# Patient Record
Sex: MALE | Race: WHITE | HISPANIC OR LATINO | Employment: FULL TIME | URBAN - METROPOLITAN AREA
[De-identification: names, ages, dates, MRNs, and addresses within clinical notes are randomized per-mention and may not be internally consistent; named-entity substitution may affect disease eponyms.]

---

## 2023-12-21 ENCOUNTER — APPOINTMENT (OUTPATIENT)
Dept: RADIOLOGY | Facility: MEDICAL CENTER | Age: 48
DRG: 854 | End: 2023-12-21
Attending: EMERGENCY MEDICINE

## 2023-12-21 ENCOUNTER — HOSPITAL ENCOUNTER (INPATIENT)
Facility: MEDICAL CENTER | Age: 48
LOS: 8 days | DRG: 854 | End: 2023-12-29
Attending: EMERGENCY MEDICINE | Admitting: HOSPITALIST

## 2023-12-21 ENCOUNTER — ANESTHESIA (OUTPATIENT)
Dept: SURGERY | Facility: MEDICAL CENTER | Age: 48
DRG: 854 | End: 2023-12-21

## 2023-12-21 ENCOUNTER — ANESTHESIA EVENT (OUTPATIENT)
Dept: SURGERY | Facility: MEDICAL CENTER | Age: 48
DRG: 854 | End: 2023-12-21

## 2023-12-21 DIAGNOSIS — K21.9 GASTROESOPHAGEAL REFLUX DISEASE WITHOUT ESOPHAGITIS: ICD-10-CM

## 2023-12-21 DIAGNOSIS — Z89.511 STATUS POST BELOW-KNEE AMPUTATION OF RIGHT LOWER EXTREMITY (HCC): ICD-10-CM

## 2023-12-21 DIAGNOSIS — I10 PRIMARY HYPERTENSION: ICD-10-CM

## 2023-12-21 DIAGNOSIS — R78.81 BACTEREMIA: ICD-10-CM

## 2023-12-21 DIAGNOSIS — E11.52 GANGRENE ASSOCIATED WITH DIABETES MELLITUS (HCC): ICD-10-CM

## 2023-12-21 DIAGNOSIS — J06.9 UPPER RESPIRATORY TRACT INFECTION, UNSPECIFIED TYPE: ICD-10-CM

## 2023-12-21 PROBLEM — M86.071 ACUTE HEMATOGENOUS OSTEOMYELITIS OF RIGHT FOOT (HCC): Status: ACTIVE | Noted: 2023-12-21

## 2023-12-21 PROBLEM — T14.8XXA FRACTURE: Status: ACTIVE | Noted: 2023-12-21

## 2023-12-21 PROBLEM — A41.9 SEPSIS (HCC): Status: ACTIVE | Noted: 2023-12-21

## 2023-12-21 PROBLEM — L03.115 CELLULITIS OF RIGHT LOWER EXTREMITY: Status: ACTIVE | Noted: 2023-12-21

## 2023-12-21 PROBLEM — R94.31 ABNORMAL EKG: Status: ACTIVE | Noted: 2023-12-21

## 2023-12-21 PROBLEM — N17.9 AKI (ACUTE KIDNEY INJURY) (HCC): Status: ACTIVE | Noted: 2023-12-21

## 2023-12-21 PROBLEM — D64.9 ANEMIA: Status: ACTIVE | Noted: 2023-12-21

## 2023-12-21 PROBLEM — E87.1 HYPONATREMIA: Status: ACTIVE | Noted: 2023-12-21

## 2023-12-21 PROBLEM — E86.0 DEHYDRATION: Status: ACTIVE | Noted: 2023-12-21

## 2023-12-21 LAB
ALBUMIN SERPL BCP-MCNC: 2.2 G/DL (ref 3.2–4.9)
ALBUMIN/GLOB SERPL: 0.5 G/DL
ALP SERPL-CCNC: 128 U/L (ref 30–99)
ALT SERPL-CCNC: 10 U/L (ref 2–50)
ANION GAP SERPL CALC-SCNC: 14 MMOL/L (ref 7–16)
AST SERPL-CCNC: 10 U/L (ref 12–45)
BASOPHILS # BLD AUTO: 0.3 % (ref 0–1.8)
BASOPHILS # BLD: 0.04 K/UL (ref 0–0.12)
BILIRUB SERPL-MCNC: 0.7 MG/DL (ref 0.1–1.5)
BUN SERPL-MCNC: 24 MG/DL (ref 8–22)
CALCIUM ALBUM COR SERPL-MCNC: 9.6 MG/DL (ref 8.5–10.5)
CALCIUM SERPL-MCNC: 8.2 MG/DL (ref 8.4–10.2)
CHLORIDE SERPL-SCNC: 93 MMOL/L (ref 96–112)
CK SERPL-CCNC: 30 U/L (ref 0–154)
CO2 SERPL-SCNC: 21 MMOL/L (ref 20–33)
CREAT SERPL-MCNC: 1.67 MG/DL (ref 0.5–1.4)
EKG IMPRESSION: NORMAL
EOSINOPHIL # BLD AUTO: 0.02 K/UL (ref 0–0.51)
EOSINOPHIL NFR BLD: 0.1 % (ref 0–6.9)
ERYTHROCYTE [DISTWIDTH] IN BLOOD BY AUTOMATED COUNT: 36.9 FL (ref 35.9–50)
ERYTHROCYTE [SEDIMENTATION RATE] IN BLOOD BY WESTERGREN METHOD: 80 MM/HOUR (ref 0–20)
GFR SERPLBLD CREATININE-BSD FMLA CKD-EPI: 50 ML/MIN/1.73 M 2
GLOBULIN SER CALC-MCNC: 4.7 G/DL (ref 1.9–3.5)
GLUCOSE BLD STRIP.AUTO-MCNC: 140 MG/DL (ref 65–99)
GLUCOSE SERPL-MCNC: 192 MG/DL (ref 65–99)
GRAM STN SPEC: NORMAL
HCT VFR BLD AUTO: 36.7 % (ref 42–52)
HGB BLD-MCNC: 12.4 G/DL (ref 14–18)
IMM GRANULOCYTES # BLD AUTO: 0.09 K/UL (ref 0–0.11)
IMM GRANULOCYTES NFR BLD AUTO: 0.6 % (ref 0–0.9)
LACTATE SERPL-SCNC: 1.4 MMOL/L (ref 0.5–2)
LACTATE SERPL-SCNC: 1.5 MMOL/L (ref 0.5–2)
LACTATE SERPL-SCNC: 1.6 MMOL/L (ref 0.5–2)
LACTATE SERPL-SCNC: 2.4 MMOL/L (ref 0.5–2)
LYMPHOCYTES # BLD AUTO: 2.35 K/UL (ref 1–4.8)
LYMPHOCYTES NFR BLD: 15.7 % (ref 22–41)
MCH RBC QN AUTO: 28.7 PG (ref 27–33)
MCHC RBC AUTO-ENTMCNC: 33.8 G/DL (ref 32.3–36.5)
MCV RBC AUTO: 85 FL (ref 81.4–97.8)
MONOCYTES # BLD AUTO: 1 K/UL (ref 0–0.85)
MONOCYTES NFR BLD AUTO: 6.7 % (ref 0–13.4)
NEUTROPHILS # BLD AUTO: 11.44 K/UL (ref 1.82–7.42)
NEUTROPHILS NFR BLD: 76.6 % (ref 44–72)
NRBC # BLD AUTO: 0 K/UL
NRBC BLD-RTO: 0 /100 WBC (ref 0–0.2)
PATHOLOGY CONSULT NOTE: NORMAL
PLATELET # BLD AUTO: 288 K/UL (ref 164–446)
PMV BLD AUTO: 9.7 FL (ref 9–12.9)
POTASSIUM SERPL-SCNC: 3.9 MMOL/L (ref 3.6–5.5)
PROT SERPL-MCNC: 6.9 G/DL (ref 6–8.2)
RBC # BLD AUTO: 4.32 M/UL (ref 4.7–6.1)
SIGNIFICANT IND 70042: NORMAL
SITE SITE: NORMAL
SODIUM SERPL-SCNC: 128 MMOL/L (ref 135–145)
SOURCE SOURCE: NORMAL
WBC # BLD AUTO: 14.9 K/UL (ref 4.8–10.8)

## 2023-12-21 PROCEDURE — 93010 ELECTROCARDIOGRAM REPORT: CPT | Mod: MISDOCU | Performed by: HOSPITALIST

## 2023-12-21 PROCEDURE — 87147 CULTURE TYPE IMMUNOLOGIC: CPT | Mod: 91

## 2023-12-21 PROCEDURE — 160002 HCHG RECOVERY MINUTES (STAT): Performed by: ORTHOPAEDIC SURGERY

## 2023-12-21 PROCEDURE — 770020 HCHG ROOM/CARE - TELE (206)

## 2023-12-21 PROCEDURE — 87205 SMEAR GRAM STAIN: CPT | Mod: 91

## 2023-12-21 PROCEDURE — 700105 HCHG RX REV CODE 258: Performed by: ORTHOPAEDIC SURGERY

## 2023-12-21 PROCEDURE — 82550 ASSAY OF CK (CPK): CPT

## 2023-12-21 PROCEDURE — 700111 HCHG RX REV CODE 636 W/ 250 OVERRIDE (IP): Performed by: EMERGENCY MEDICINE

## 2023-12-21 PROCEDURE — 160035 HCHG PACU - 1ST 60 MINS PHASE I: Performed by: ORTHOPAEDIC SURGERY

## 2023-12-21 PROCEDURE — 700102 HCHG RX REV CODE 250 W/ 637 OVERRIDE(OP): Performed by: ANESTHESIOLOGY

## 2023-12-21 PROCEDURE — 700111 HCHG RX REV CODE 636 W/ 250 OVERRIDE (IP): Performed by: ANESTHESIOLOGY

## 2023-12-21 PROCEDURE — 85025 COMPLETE CBC W/AUTO DIFF WBC: CPT

## 2023-12-21 PROCEDURE — 0Y6V0Z0 DETACHMENT AT RIGHT 4TH TOE, COMPLETE, OPEN APPROACH: ICD-10-PCS | Performed by: ORTHOPAEDIC SURGERY

## 2023-12-21 PROCEDURE — 93005 ELECTROCARDIOGRAM TRACING: CPT | Performed by: HOSPITALIST

## 2023-12-21 PROCEDURE — 160038 HCHG SURGERY MINUTES - EA ADDL 1 MIN LEVEL 2: Performed by: ORTHOPAEDIC SURGERY

## 2023-12-21 PROCEDURE — 88305 TISSUE EXAM BY PATHOLOGIST: CPT

## 2023-12-21 PROCEDURE — 160009 HCHG ANES TIME/MIN: Performed by: ORTHOPAEDIC SURGERY

## 2023-12-21 PROCEDURE — 87075 CULTR BACTERIA EXCEPT BLOOD: CPT

## 2023-12-21 PROCEDURE — 160027 HCHG SURGERY MINUTES - 1ST 30 MINS LEVEL 2: Performed by: ORTHOPAEDIC SURGERY

## 2023-12-21 PROCEDURE — 700101 HCHG RX REV CODE 250: Performed by: ANESTHESIOLOGY

## 2023-12-21 PROCEDURE — A9270 NON-COVERED ITEM OR SERVICE: HCPCS | Performed by: ANESTHESIOLOGY

## 2023-12-21 PROCEDURE — 82962 GLUCOSE BLOOD TEST: CPT

## 2023-12-21 PROCEDURE — 88311 DECALCIFY TISSUE: CPT

## 2023-12-21 PROCEDURE — 96365 THER/PROPH/DIAG IV INF INIT: CPT

## 2023-12-21 PROCEDURE — 700105 HCHG RX REV CODE 258: Performed by: HOSPITALIST

## 2023-12-21 PROCEDURE — 87077 CULTURE AEROBIC IDENTIFY: CPT | Mod: 91

## 2023-12-21 PROCEDURE — 83605 ASSAY OF LACTIC ACID: CPT | Mod: 91

## 2023-12-21 PROCEDURE — 96366 THER/PROPH/DIAG IV INF ADDON: CPT

## 2023-12-21 PROCEDURE — 36415 COLL VENOUS BLD VENIPUNCTURE: CPT

## 2023-12-21 PROCEDURE — 700101 HCHG RX REV CODE 250: Performed by: EMERGENCY MEDICINE

## 2023-12-21 PROCEDURE — 0QBN0ZZ EXCISION OF RIGHT METATARSAL, OPEN APPROACH: ICD-10-PCS | Performed by: ORTHOPAEDIC SURGERY

## 2023-12-21 PROCEDURE — 87040 BLOOD CULTURE FOR BACTERIA: CPT

## 2023-12-21 PROCEDURE — 94760 N-INVAS EAR/PLS OXIMETRY 1: CPT

## 2023-12-21 PROCEDURE — 83036 HEMOGLOBIN GLYCOSYLATED A1C: CPT

## 2023-12-21 PROCEDURE — 71045 X-RAY EXAM CHEST 1 VIEW: CPT

## 2023-12-21 PROCEDURE — 99223 1ST HOSP IP/OBS HIGH 75: CPT | Performed by: HOSPITALIST

## 2023-12-21 PROCEDURE — 96367 TX/PROPH/DG ADDL SEQ IV INF: CPT

## 2023-12-21 PROCEDURE — 700111 HCHG RX REV CODE 636 W/ 250 OVERRIDE (IP): Mod: JZ | Performed by: ANESTHESIOLOGY

## 2023-12-21 PROCEDURE — 87076 CULTURE ANAEROBE IDENT EACH: CPT | Mod: 91

## 2023-12-21 PROCEDURE — 160048 HCHG OR STATISTICAL LEVEL 1-5: Performed by: ORTHOPAEDIC SURGERY

## 2023-12-21 PROCEDURE — 73630 X-RAY EXAM OF FOOT: CPT | Mod: RT

## 2023-12-21 PROCEDURE — 80053 COMPREHEN METABOLIC PANEL: CPT

## 2023-12-21 PROCEDURE — 85652 RBC SED RATE AUTOMATED: CPT

## 2023-12-21 PROCEDURE — 87186 SC STD MICRODIL/AGAR DIL: CPT | Mod: 91

## 2023-12-21 PROCEDURE — 99291 CRITICAL CARE FIRST HOUR: CPT

## 2023-12-21 PROCEDURE — 87015 SPECIMEN INFECT AGNT CONCNTJ: CPT | Mod: 91

## 2023-12-21 PROCEDURE — 87102 FUNGUS ISOLATION CULTURE: CPT | Mod: 91

## 2023-12-21 PROCEDURE — 87070 CULTURE OTHR SPECIMN AEROBIC: CPT | Mod: 91

## 2023-12-21 PROCEDURE — 700105 HCHG RX REV CODE 258: Performed by: EMERGENCY MEDICINE

## 2023-12-21 PROCEDURE — 0Y6X0Z0 DETACHMENT AT RIGHT 5TH TOE, COMPLETE, OPEN APPROACH: ICD-10-PCS | Performed by: ORTHOPAEDIC SURGERY

## 2023-12-21 RX ORDER — DIPHENHYDRAMINE HYDROCHLORIDE 50 MG/ML
6.25 INJECTION INTRAMUSCULAR; INTRAVENOUS
Status: DISCONTINUED | OUTPATIENT
Start: 2023-12-21 | End: 2023-12-21 | Stop reason: HOSPADM

## 2023-12-21 RX ORDER — BISACODYL 10 MG
10 SUPPOSITORY, RECTAL RECTAL
Status: DISCONTINUED | OUTPATIENT
Start: 2023-12-21 | End: 2023-12-29 | Stop reason: HOSPADM

## 2023-12-21 RX ORDER — IBUPROFEN 200 MG
400 TABLET ORAL EVERY 8 HOURS PRN
Status: ON HOLD | COMMUNITY
End: 2023-12-29

## 2023-12-21 RX ORDER — OXYCODONE HYDROCHLORIDE 5 MG/1
5 TABLET ORAL
Status: DISCONTINUED | OUTPATIENT
Start: 2023-12-21 | End: 2023-12-27

## 2023-12-21 RX ORDER — LINEZOLID 2 MG/ML
600 INJECTION, SOLUTION INTRAVENOUS EVERY 12 HOURS
Status: DISCONTINUED | OUTPATIENT
Start: 2023-12-21 | End: 2023-12-22

## 2023-12-21 RX ORDER — HYDROMORPHONE HYDROCHLORIDE 1 MG/ML
0.2 INJECTION, SOLUTION INTRAMUSCULAR; INTRAVENOUS; SUBCUTANEOUS
Status: DISCONTINUED | OUTPATIENT
Start: 2023-12-21 | End: 2023-12-21 | Stop reason: HOSPADM

## 2023-12-21 RX ORDER — HALOPERIDOL 5 MG/ML
1 INJECTION INTRAMUSCULAR
Status: DISCONTINUED | OUTPATIENT
Start: 2023-12-21 | End: 2023-12-21 | Stop reason: HOSPADM

## 2023-12-21 RX ORDER — ACETAMINOPHEN 325 MG/1
650 TABLET ORAL EVERY 6 HOURS PRN
Status: DISCONTINUED | OUTPATIENT
Start: 2023-12-21 | End: 2023-12-29 | Stop reason: HOSPADM

## 2023-12-21 RX ORDER — HYDROMORPHONE HYDROCHLORIDE 1 MG/ML
0.25 INJECTION, SOLUTION INTRAMUSCULAR; INTRAVENOUS; SUBCUTANEOUS
Status: DISCONTINUED | OUTPATIENT
Start: 2023-12-21 | End: 2023-12-27

## 2023-12-21 RX ORDER — ONDANSETRON 2 MG/ML
INJECTION INTRAMUSCULAR; INTRAVENOUS PRN
Status: DISCONTINUED | OUTPATIENT
Start: 2023-12-21 | End: 2023-12-21 | Stop reason: SURG

## 2023-12-21 RX ORDER — ACETAMINOPHEN 500 MG
1000 TABLET ORAL EVERY 6 HOURS PRN
Status: DISCONTINUED | OUTPATIENT
Start: 2023-12-21 | End: 2023-12-21 | Stop reason: HOSPADM

## 2023-12-21 RX ORDER — MEPERIDINE HYDROCHLORIDE 25 MG/ML
12.5 INJECTION INTRAMUSCULAR; INTRAVENOUS; SUBCUTANEOUS
Status: DISCONTINUED | OUTPATIENT
Start: 2023-12-21 | End: 2023-12-21 | Stop reason: HOSPADM

## 2023-12-21 RX ORDER — SODIUM CHLORIDE, SODIUM LACTATE, POTASSIUM CHLORIDE, AND CALCIUM CHLORIDE .6; .31; .03; .02 G/100ML; G/100ML; G/100ML; G/100ML
1000 INJECTION, SOLUTION INTRAVENOUS ONCE
Status: ACTIVE | OUTPATIENT
Start: 2023-12-21 | End: 2023-12-22

## 2023-12-21 RX ORDER — SODIUM CHLORIDE, SODIUM LACTATE, POTASSIUM CHLORIDE, CALCIUM CHLORIDE 600; 310; 30; 20 MG/100ML; MG/100ML; MG/100ML; MG/100ML
INJECTION, SOLUTION INTRAVENOUS CONTINUOUS
Status: DISCONTINUED | OUTPATIENT
Start: 2023-12-21 | End: 2023-12-21 | Stop reason: HOSPADM

## 2023-12-21 RX ORDER — EPHEDRINE SULFATE 50 MG/ML
INJECTION, SOLUTION INTRAVENOUS PRN
Status: DISCONTINUED | OUTPATIENT
Start: 2023-12-21 | End: 2023-12-21 | Stop reason: SURG

## 2023-12-21 RX ORDER — DEXTROSE MONOHYDRATE 25 G/50ML
25 INJECTION, SOLUTION INTRAVENOUS
Status: DISCONTINUED | OUTPATIENT
Start: 2023-12-21 | End: 2023-12-29 | Stop reason: HOSPADM

## 2023-12-21 RX ORDER — SODIUM CHLORIDE, SODIUM LACTATE, POTASSIUM CHLORIDE, AND CALCIUM CHLORIDE .6; .31; .03; .02 G/100ML; G/100ML; G/100ML; G/100ML
500 INJECTION, SOLUTION INTRAVENOUS
Status: DISCONTINUED | OUTPATIENT
Start: 2023-12-21 | End: 2023-12-29 | Stop reason: HOSPADM

## 2023-12-21 RX ORDER — SODIUM CHLORIDE, SODIUM LACTATE, POTASSIUM CHLORIDE, AND CALCIUM CHLORIDE .6; .31; .03; .02 G/100ML; G/100ML; G/100ML; G/100ML
30 INJECTION, SOLUTION INTRAVENOUS
Status: DISCONTINUED | OUTPATIENT
Start: 2023-12-21 | End: 2023-12-29 | Stop reason: HOSPADM

## 2023-12-21 RX ORDER — SODIUM CHLORIDE, SODIUM LACTATE, POTASSIUM CHLORIDE, AND CALCIUM CHLORIDE .6; .31; .03; .02 G/100ML; G/100ML; G/100ML; G/100ML
30 INJECTION, SOLUTION INTRAVENOUS ONCE
Status: COMPLETED | OUTPATIENT
Start: 2023-12-21 | End: 2023-12-21

## 2023-12-21 RX ORDER — ONDANSETRON 2 MG/ML
4 INJECTION INTRAMUSCULAR; INTRAVENOUS
Status: DISCONTINUED | OUTPATIENT
Start: 2023-12-21 | End: 2023-12-21 | Stop reason: HOSPADM

## 2023-12-21 RX ORDER — EPHEDRINE SULFATE 50 MG/ML
10 INJECTION, SOLUTION INTRAVENOUS
Status: DISCONTINUED | OUTPATIENT
Start: 2023-12-21 | End: 2023-12-21 | Stop reason: HOSPADM

## 2023-12-21 RX ORDER — HYDROMORPHONE HYDROCHLORIDE 1 MG/ML
0.4 INJECTION, SOLUTION INTRAMUSCULAR; INTRAVENOUS; SUBCUTANEOUS
Status: DISCONTINUED | OUTPATIENT
Start: 2023-12-21 | End: 2023-12-21 | Stop reason: HOSPADM

## 2023-12-21 RX ORDER — HYDRALAZINE HYDROCHLORIDE 20 MG/ML
5 INJECTION INTRAMUSCULAR; INTRAVENOUS
Status: DISCONTINUED | OUTPATIENT
Start: 2023-12-21 | End: 2023-12-21 | Stop reason: HOSPADM

## 2023-12-21 RX ORDER — OXYCODONE HCL 5 MG/5 ML
10 SOLUTION, ORAL ORAL
Status: COMPLETED | OUTPATIENT
Start: 2023-12-21 | End: 2023-12-21

## 2023-12-21 RX ORDER — OXYCODONE HYDROCHLORIDE 5 MG/1
2.5 TABLET ORAL
Status: DISCONTINUED | OUTPATIENT
Start: 2023-12-21 | End: 2023-12-27

## 2023-12-21 RX ORDER — SODIUM CHLORIDE, SODIUM LACTATE, POTASSIUM CHLORIDE, CALCIUM CHLORIDE 600; 310; 30; 20 MG/100ML; MG/100ML; MG/100ML; MG/100ML
INJECTION, SOLUTION INTRAVENOUS CONTINUOUS
Status: ACTIVE | OUTPATIENT
Start: 2023-12-21 | End: 2023-12-21

## 2023-12-21 RX ORDER — AMOXICILLIN 250 MG
2 CAPSULE ORAL 2 TIMES DAILY
Status: DISCONTINUED | OUTPATIENT
Start: 2023-12-21 | End: 2023-12-29 | Stop reason: HOSPADM

## 2023-12-21 RX ORDER — LIDOCAINE HYDROCHLORIDE 20 MG/ML
INJECTION, SOLUTION EPIDURAL; INFILTRATION; INTRACAUDAL; PERINEURAL PRN
Status: DISCONTINUED | OUTPATIENT
Start: 2023-12-21 | End: 2023-12-21 | Stop reason: SURG

## 2023-12-21 RX ORDER — ACETAMINOPHEN 500 MG
500-1000 TABLET ORAL EVERY 6 HOURS PRN
COMMUNITY

## 2023-12-21 RX ORDER — OXYCODONE HCL 5 MG/5 ML
5 SOLUTION, ORAL ORAL
Status: COMPLETED | OUTPATIENT
Start: 2023-12-21 | End: 2023-12-21

## 2023-12-21 RX ORDER — POLYETHYLENE GLYCOL 3350 17 G/17G
1 POWDER, FOR SOLUTION ORAL
Status: DISCONTINUED | OUTPATIENT
Start: 2023-12-21 | End: 2023-12-29 | Stop reason: HOSPADM

## 2023-12-21 RX ORDER — SODIUM CHLORIDE, SODIUM LACTATE, POTASSIUM CHLORIDE, CALCIUM CHLORIDE 600; 310; 30; 20 MG/100ML; MG/100ML; MG/100ML; MG/100ML
INJECTION, SOLUTION INTRAVENOUS CONTINUOUS
Status: DISCONTINUED | OUTPATIENT
Start: 2023-12-21 | End: 2023-12-29 | Stop reason: HOSPADM

## 2023-12-21 RX ORDER — HYDROMORPHONE HYDROCHLORIDE 1 MG/ML
0.1 INJECTION, SOLUTION INTRAMUSCULAR; INTRAVENOUS; SUBCUTANEOUS
Status: DISCONTINUED | OUTPATIENT
Start: 2023-12-21 | End: 2023-12-21 | Stop reason: HOSPADM

## 2023-12-21 RX ADMIN — FENTANYL CITRATE 50 MCG: 50 INJECTION, SOLUTION INTRAMUSCULAR; INTRAVENOUS at 19:53

## 2023-12-21 RX ADMIN — SODIUM CHLORIDE, POTASSIUM CHLORIDE, SODIUM LACTATE AND CALCIUM CHLORIDE: 600; 310; 30; 20 INJECTION, SOLUTION INTRAVENOUS at 18:39

## 2023-12-21 RX ADMIN — PROPOFOL 150 MG: 10 INJECTION, EMULSION INTRAVENOUS at 18:43

## 2023-12-21 RX ADMIN — FENTANYL CITRATE 50 MCG: 50 INJECTION, SOLUTION INTRAMUSCULAR; INTRAVENOUS at 18:43

## 2023-12-21 RX ADMIN — SODIUM CHLORIDE, POTASSIUM CHLORIDE, SODIUM LACTATE AND CALCIUM CHLORIDE 2160 ML: 600; 310; 30; 20 INJECTION, SOLUTION INTRAVENOUS at 13:21

## 2023-12-21 RX ADMIN — PIPERACILLIN AND TAZOBACTAM 3.38 G: 3; .375 INJECTION, POWDER, LYOPHILIZED, FOR SOLUTION INTRAVENOUS; PARENTERAL at 12:33

## 2023-12-21 RX ADMIN — VANCOMYCIN HYDROCHLORIDE 1750 MG: 5 INJECTION, POWDER, LYOPHILIZED, FOR SOLUTION INTRAVENOUS at 13:42

## 2023-12-21 RX ADMIN — FENTANYL CITRATE 50 MCG: 50 INJECTION, SOLUTION INTRAMUSCULAR; INTRAVENOUS at 19:03

## 2023-12-21 RX ADMIN — OXYCODONE HYDROCHLORIDE 10 MG: 5 SOLUTION ORAL at 19:53

## 2023-12-21 RX ADMIN — ONDANSETRON 4 MG: 2 INJECTION INTRAMUSCULAR; INTRAVENOUS at 19:11

## 2023-12-21 RX ADMIN — EPHEDRINE SULFATE 10 MG: 50 INJECTION, SOLUTION INTRAVENOUS at 18:47

## 2023-12-21 RX ADMIN — SODIUM CHLORIDE, POTASSIUM CHLORIDE, SODIUM LACTATE AND CALCIUM CHLORIDE: 600; 310; 30; 20 INJECTION, SOLUTION INTRAVENOUS at 21:42

## 2023-12-21 RX ADMIN — LIDOCAINE HYDROCHLORIDE 50 MG: 20 INJECTION, SOLUTION EPIDURAL; INFILTRATION; INTRACAUDAL at 18:43

## 2023-12-21 RX ADMIN — FENTANYL CITRATE 50 MCG: 50 INJECTION, SOLUTION INTRAMUSCULAR; INTRAVENOUS at 19:06

## 2023-12-21 ASSESSMENT — ENCOUNTER SYMPTOMS
FEVER: 1
STRIDOR: 0
EYE DISCHARGE: 0
EYE REDNESS: 0
VOMITING: 0
COUGH: 0
ABDOMINAL PAIN: 0
NERVOUS/ANXIOUS: 0
FOCAL WEAKNESS: 0
BRUISES/BLEEDS EASILY: 0
MYALGIAS: 0
FLANK PAIN: 0
SHORTNESS OF BREATH: 0
CHILLS: 1

## 2023-12-21 ASSESSMENT — PATIENT HEALTH QUESTIONNAIRE - PHQ9
2. FEELING DOWN, DEPRESSED, IRRITABLE, OR HOPELESS: NOT AT ALL
SUM OF ALL RESPONSES TO PHQ9 QUESTIONS 1 AND 2: 0
1. LITTLE INTEREST OR PLEASURE IN DOING THINGS: NOT AT ALL

## 2023-12-21 ASSESSMENT — PAIN DESCRIPTION - PAIN TYPE
TYPE: SURGICAL PAIN
TYPE: ACUTE PAIN
TYPE: ACUTE PAIN
TYPE: SURGICAL PAIN

## 2023-12-21 ASSESSMENT — FIBROSIS 4 INDEX: FIB4 SCORE: 0.54

## 2023-12-21 NOTE — ED NOTES
Wound dressed with gauze and coban. Patient aware of plan of care. Pt declined pain medication. Aware he may request this at any time. No other needs at this time. Call light in reach.

## 2023-12-21 NOTE — ED PROVIDER NOTES
"ED Provider Note    CHIEF COMPLAINT  Chief Complaint   Patient presents with    Wound Infection     Pt states he was in shower and felt something bite him. Pt states he then noticed a brown spider wash down the drain. Pt started feeling sick and foot was swollen and \"then all of a sudden the swelling disappeared and foot shriveled up\". Foot appears necrotic infection           HPI/ROS    Nestor Mcknezie Jr. is a 48 y.o. male who presents with complaint of right foot pain.  He states that 4 days ago he was in the shower and felt a bite on his foot and we Tara and was brown spider in the shower that went down the drain.  Since then has had increasing swelling and pain and redness to his right foot increasing severity.  States there is blackness to the top of his foot and spreading to his fourth toe on the right.  The patient is unsure his last tetanus booster.  He is a borderline diabetic and states he does not his blood sugar is.  He had subjective fevers associated and severe pressure but no severe pain.    PAST MEDICAL HISTORY   has a past medical history of Prediabetes.    SURGICAL HISTORY  patient denies any surgical history    FAMILY HISTORY  History reviewed. No pertinent family history.    SOCIAL HISTORY  Social History     Tobacco Use    Smoking status: Never    Smokeless tobacco: Never   Vaping Use    Vaping Use: Never used   Substance and Sexual Activity    Alcohol use: Not Currently    Drug use: Not Currently    Sexual activity: Not on file       CURRENT MEDICATIONS  Home Medications       Reviewed by Dallas Minaya (Pharmacy Tech) on 12/21/23 at 1217  Med List Status: Complete     Medication Last Dose Status   acetaminophen (TYLENOL) 500 MG Tab 12/19/2023 Active   Amoxicillin (AMOXIL PO) 12/19/2023 Active   ibuprofen (MOTRIN) 200 MG Tab 12/21/2023 Active   metformin (GLUCOPHAGE) 1000 MG tablet 12/20/2023 Active                    ALLERGIES  No Known Allergies    PHYSICAL EXAM  VITAL SIGNS: BP " "136/83   Pulse 85   Temp 36.4 °C (97.5 °F) (Temporal)   Resp 16   Ht 1.651 m (5' 5\")   Wt 72 kg (158 lb 11.7 oz)   SpO2 99%   BMI 26.41 kg/m²      Nursing notes and vitals reviewed.  Constitutional: Well developed, Well nourished, No acute distress, Non-toxic appearance.   Eyes: PERRLA, EOMI, Conjunctiva normal, No discharge.   Cardiovascular: Tachycardic, Normal rhythm, No murmurs, No rubs, No gallops.   Thorax & Lungs: No respiratory distress, No rales, No rhonchi, No wheezing, No chest tenderness.   Abdomen: Bowel sounds normal, Soft, No tenderness, No guarding, No rebound, No masses, No pulsatile masses.   Skin:  necrotic area on the dorsum of the foot lateral extending to the fourth toe with surrounding erythema, edema, slight fluctuance  Extremities: significant skin abnormality, distal cap refills less than 2 seconds except for on the fourth toe, the foot is warm to touch, tenderness on palpation, no significant calf edema  Neurologic: Strength and sensation intact to the right lower extremity        DIAGNOSTIC STUDIES / PROCEDURES  EKG  I have independently interpreted this EKG  Sinus tachycardia on monitor    LABS  Results for orders placed or performed during the hospital encounter of 12/21/23   LACTIC ACID   Result Value Ref Range    Lactic Acid 2.4 (H) 0.5 - 2.0 mmol/L   CBC WITH DIFFERENTIAL   Result Value Ref Range    WBC 14.9 (H) 4.8 - 10.8 K/uL    RBC 4.32 (L) 4.70 - 6.10 M/uL    Hemoglobin 12.4 (L) 14.0 - 18.0 g/dL    Hematocrit 36.7 (L) 42.0 - 52.0 %    MCV 85.0 81.4 - 97.8 fL    MCH 28.7 27.0 - 33.0 pg    MCHC 33.8 32.3 - 36.5 g/dL    RDW 36.9 35.9 - 50.0 fL    Platelet Count 288 164 - 446 K/uL    MPV 9.7 9.0 - 12.9 fL    Neutrophils-Polys 76.60 (H) 44.00 - 72.00 %    Lymphocytes 15.70 (L) 22.00 - 41.00 %    Monocytes 6.70 0.00 - 13.40 %    Eosinophils 0.10 0.00 - 6.90 %    Basophils 0.30 0.00 - 1.80 %    Immature Granulocytes 0.60 0.00 - 0.90 %    Nucleated RBC 0.00 0.00 - 0.20 /100 WBC    " Neutrophils (Absolute) 11.44 (H) 1.82 - 7.42 K/uL    Lymphs (Absolute) 2.35 1.00 - 4.80 K/uL    Monos (Absolute) 1.00 (H) 0.00 - 0.85 K/uL    Eos (Absolute) 0.02 0.00 - 0.51 K/uL    Baso (Absolute) 0.04 0.00 - 0.12 K/uL    Immature Granulocytes (abs) 0.09 0.00 - 0.11 K/uL    NRBC (Absolute) 0.00 K/uL   COMP METABOLIC PANEL   Result Value Ref Range    Sodium 128 (L) 135 - 145 mmol/L    Potassium 3.9 3.6 - 5.5 mmol/L    Chloride 93 (L) 96 - 112 mmol/L    Co2 21 20 - 33 mmol/L    Anion Gap 14.0 7.0 - 16.0    Glucose 192 (H) 65 - 99 mg/dL    Bun 24 (H) 8 - 22 mg/dL    Creatinine 1.67 (H) 0.50 - 1.40 mg/dL    Calcium 8.2 (L) 8.4 - 10.2 mg/dL    Correct Calcium 9.6 8.5 - 10.5 mg/dL    AST(SGOT) 10 (L) 12 - 45 U/L    ALT(SGPT) 10 2 - 50 U/L    Alkaline Phosphatase 128 (H) 30 - 99 U/L    Total Bilirubin 0.7 0.1 - 1.5 mg/dL    Albumin 2.2 (L) 3.2 - 4.9 g/dL    Total Protein 6.9 6.0 - 8.2 g/dL    Globulin 4.7 (H) 1.9 - 3.5 g/dL    A-G Ratio 0.5 g/dL   Sed Rate   Result Value Ref Range    Sed Rate Westergren 80 (H) 0 - 20 mm/hour   CREATINE KINASE   Result Value Ref Range    CPK Total 30 0 - 154 U/L   ESTIMATED GFR   Result Value Ref Range    GFR (CKD-EPI) 50 (A) >60 mL/min/1.73 m 2         RADIOLOGY    Radiologist interpretation:   DX-FOOT-COMPLETE 3+ RIGHT   Final Result      1.  Acute fracture of the base of the fifth metatarsal.      2.  Extensive soft tissue gas involving the mid and forefoot bilaterally likely representing tissue necrosis/gangrene.      3.  Demineralization of the fifth metatarsal possibly representing presence of osteomyelitis.      DX-CHEST-PORTABLE (1 VIEW)   Final Result      No evidence of acute cardiopulmonary process.            COURSE & MEDICAL DECISION MAKING    ED Observation Status? No; Patient does not meet criteria for ED Observation.     INITIAL ASSESSMENT, COURSE AND PLAN  Care Narrative: This is a pleasant 48-year-old gentleman presents with necrosis to the right foot secondary to spider  bite.  Here in the emergency department, the patient has significant infection, evidence of sepsis clinically therefore the patient went a sepsis protocol including IV fluids and IV antibiotics initial presentation.  Wound culture and blood cultures have been sent.  X-ray of the foot reveals evidence of 1/5 metatarsal base fracture as well as possible osteomyelitis of the distal aspect of the fifth metatarsal.  This seems to be extremely advanced for only 40 history of infection therefore I discussed the patient with Dr. Jacques, orthopedic surgery, who will come to the patient's bedside for possible surgical intervention for possible necrotizing fasciitis.  The patient is received IV fluids, IV antibiotics, ultrasound been sent.  I discussed the patient with Dr. Beltran for hospitalization.    CRITICAL CARE  The very real possibilty of a deterioration of this patient's condition required the highest level of my preparedness for sudden, emergent intervention.  I provided critical care services, which included medication orders, frequent reevaluations of the patient's condition and response to treatment, ordering and reviewing test results, and discussing the case with various consultants.  The critical care time associated with the care of the patient was 35 minutes. Review chart for interventions. This time is exclusive of any other billable procedures.     DISPOSITION AND DISCUSSIONS    FINAL DIAGNOSIS  Right foot necrosis  Osteomyelitis  Sepsis  Critical care time 35 minutes    Electronically signed by: Silverio Michele D.O., 12/21/2023 12:10 PM

## 2023-12-21 NOTE — ED NOTES
Pharmacy Medication Reconciliation    ~Med rec updated and complete per patient at bedside   ~Allergies have been verified  ~Pt home pharmacy: [facility]      ~Patient reports that he completed his 3 day course of Amoxil 3 days ago.     ~Med rec unable to verify current medications with patient home pharmacy. Patient home pharmacy reports no medications on file. Patient reports that is the only pharmacy he fills medications at.      ~Anticoagulants (rivaroxaban, apixaban, edoxaban, dabigatran, warfarin, enoxaparin) taken in the last 14 days? No

## 2023-12-21 NOTE — H&P
"Hospital Medicine History & Physical Note    Date of Service  12/21/2023    Primary Care Physician  No primary care provider on file.    Consultants  Dr Jorge Luis Whaley     Code Status  Full Code    Chief Complaint  Chief Complaint   Patient presents with    Wound Infection     Pt states he was in shower and felt something bite him. Pt states he then noticed a brown spider wash down the drain. Pt started feeling sick and foot was swollen and \"then all of a sudden the swelling disappeared and foot shriveled up\". Foot appears necrotic infection     History of Presenting Illness  Nestor Mckenzie Jr. is a 48 y.o. male with a past medical history of diabetes who presented 12/21/2023 with blackish discoloration of the right toe with pain, redness and swelling.  Patient reports that he has been feeling well until 4 days ago where he felt a spider bite on his right foot.  Since then he has been having progressively worsening pain redness, swelling eventually became black.  He also has fevers and chills.  In the emergency room the patient was noticed to be tachycardic with a heart rate of 106.  His labs are concerning for acute kidney injury with a creatinine of 1.67, and hyponatremia of 128 with an elevated lactic acid of 2.4.     I discussed the plan of care with emergency department physician, and the patient.    Review of Systems  Review of Systems   Constitutional:  Positive for chills, fever and malaise/fatigue.   Eyes:  Negative for discharge and redness.   Respiratory:  Negative for cough, shortness of breath and stridor.    Cardiovascular:  Negative for chest pain and leg swelling.   Gastrointestinal:  Negative for abdominal pain and vomiting.   Genitourinary:  Negative for flank pain.   Musculoskeletal:  Positive for joint pain. Negative for myalgias.        Blackish discoloration of the fourth metatarsal on part of the toe, surrounding by swelling, and redness   Skin: Negative.    Neurological:  Negative for focal " weakness.   Endo/Heme/Allergies:  Does not bruise/bleed easily.   Psychiatric/Behavioral:  The patient is not nervous/anxious.      Past Medical History   has a past medical history of Prediabetes.    Surgical History   has no past surgical history on file.     Family History  family history is not on file.      Social History   reports that he has never smoked. He has never used smokeless tobacco. He reports that he does not currently use alcohol. He reports that he does not currently use drugs.    Allergies  No Known Allergies    Medications  Prior to Admission Medications   Prescriptions Last Dose Informant Patient Reported? Taking?   Amoxicillin (AMOXIL PO) 12/19/2023 at RAN OUT Patient Yes Yes   Sig: Take 1 Capsule by mouth every 8 hours. 3 days   acetaminophen (TYLENOL) 500 MG Tab 12/19/2023 at PRN Patient Yes Yes   Sig: Take 500-1,000 mg by mouth every 6 hours as needed for Moderate Pain.   ibuprofen (MOTRIN) 200 MG Tab 12/21/2023 at 0700 Patient Yes Yes   Sig: Take 400 mg by mouth every 8 hours as needed for Mild Pain.   metformin (GLUCOPHAGE) 1000 MG tablet 12/20/2023 at AM Patient Yes Yes   Sig: Take 1,000 mg by mouth every morning.      Facility-Administered Medications: None     Physical Exam  Temp:  [36.4 °C (97.5 °F)] 36.4 °C (97.5 °F)  Pulse:  [] 84  Resp:  [16] 16  BP: (128-147)/(83-87) 147/87  SpO2:  [99 %] 99 %  Blood Pressure: 136/83   Temperature: 36.4 °C (97.5 °F)   Pulse: 85   Respiration: 16   Pulse Oximetry: 99 %     Physical Exam  Constitutional:       General: He is not in acute distress.     Appearance: He is ill-appearing. He is not diaphoretic.   HENT:      Head: Atraumatic.      Right Ear: External ear normal.      Left Ear: External ear normal.      Nose: No congestion or rhinorrhea.      Mouth/Throat:      Mouth: Mucous membranes are dry.   Eyes:      General: No scleral icterus.        Right eye: No discharge.         Left eye: No discharge.      Pupils: Pupils are equal,  "round, and reactive to light.   Cardiovascular:      Rate and Rhythm: Regular rhythm. Tachycardia present.   Pulmonary:      Effort: Pulmonary effort is normal.   Abdominal:      General: There is no distension.   Musculoskeletal:         General: Swelling, tenderness and deformity present.      Cervical back: Neck supple. No rigidity. No muscular tenderness.      Right lower leg: Edema present.      Left lower leg: No edema.      Comments: Gangrene of the fourth metatarsal with surrounding tissue on the right foot surrounded by erythema, edema and tenderness.   Skin:     General: Skin is dry.      Capillary Refill: Capillary refill takes 2 to 3 seconds.      Coloration: Skin is not jaundiced or pale.   Neurological:      Mental Status: He is alert and oriented to person, place, and time.      Coordination: Coordination normal.   Psychiatric:         Mood and Affect: Mood normal.         Behavior: Behavior normal.       Laboratory:  Recent Labs     12/21/23  1150   WBC 14.9*   RBC 4.32*   HEMOGLOBIN 12.4*   HEMATOCRIT 36.7*   MCV 85.0   MCH 28.7   MCHC 33.8   RDW 36.9   PLATELETCT 288   MPV 9.7     Recent Labs     12/21/23  1150   SODIUM 128*   POTASSIUM 3.9   CHLORIDE 93*   CO2 21   GLUCOSE 192*   BUN 24*   CREATININE 1.67*   CALCIUM 8.2*     Recent Labs     12/21/23  1150   ALTSGPT 10   ASTSGOT 10*   ALKPHOSPHAT 128*   TBILIRUBIN 0.7   GLUCOSE 192*         No results for input(s): \"NTPROBNP\" in the last 72 hours.      No results for input(s): \"TROPONINT\" in the last 72 hours.    Imaging:  DX-FOOT-COMPLETE 3+ RIGHT   Final Result      1.  Acute fracture of the base of the fifth metatarsal.      2.  Extensive soft tissue gas involving the mid and forefoot bilaterally likely representing tissue necrosis/gangrene.      3.  Demineralization of the fifth metatarsal possibly representing presence of osteomyelitis.      DX-CHEST-PORTABLE (1 VIEW)   Final Result      No evidence of acute cardiopulmonary process.        I " patient reviewed patient EKG shows sinus rhythm with a rate of 79, there is no ST elevation, there is flattening of T wave in lead I, II, III and aVF, there is T wave inversions in lead V3-V6.  QTc is 456.    Assessment/Plan:  Justification for Admission Status  I anticipate this patient will require at least two midnights for appropriate medical management, necessitating inpatient admission because patient has sepsis secondary to gangrene of the right foot.  Will require intravenous antibiotics.  Orthopedic consultation and likely surgical intervention.    Patient will need a Telemetry bed on MEDICAL service. The patient has sepsis secondary to gangrene of the right foot     * Sepsis (HCC)- (present on admission)  Assessment & Plan  This is Sepsis Present on admission  SIRS criteria identified on my evaluation include: Tachycardia, with heart rate greater than 90 BPM, Tachypnea, with respirations greater than 20 per minute, and Leukocytosis, with WBC greater than 12,000  Clinical indicators of end organ dysfunction include Lactic Acid greater than 2 and Acute On Chronic Renal Failure, with creatinine >0.5 above baseline level  Source is cellulitis and gangrene of the right foot  Sepsis protocol initiated  Crystalloid Fluid Administration: Fluid resuscitation ordered per standard protocol - 30 mL/kg per current or ideal body weight  IV antibiotics as appropriate for source of sepsis  Reassessment: I have reassessed the patient's hemodynamic status    Cellulitis of right lower extremity- (present on admission)  Assessment & Plan  I will start Zosyn and Zyvox, follow on cultures and sensitivities  There is extensive soft tissue gas concerning for gangrene and necrosis  Orthopedics consulted     Acute hematogenous osteomyelitis of right foot (HCC)- (present on admission)  Assessment & Plan  I will start Zosyn and Zyvox, follow on cultures and sensitivities  There is extensive soft tissue gas concerning for gangrene and  necrosis  Orthopedics consulted.  Revised Cardiac Risk Index for Pre-Operative Risk score of:   2 points Class III Risk  The patient has a 10.1 % 30-day risk of death, MI, or cardiac arrest     Fracture of the fifth metatarsal- (present on admission)  Assessment & Plan  Orthopedics consulted   Revised Cardiac Risk Index for Pre-Operative Risk score of:   2 points Class III Risk  The patient has a 10.1 % 30-day risk of death, MI, or cardiac arrest   Multimodal pain control  Consider physical and Occupational Therapy, pharmacologic prophylaxis when okay with orthopedics     SRIRAM (acute kidney injury) (HCC)- (present on admission)  Assessment & Plan  Mostly due to sepsis  I will start intravenous fluids  Avoid / minimize nephrotoxins as much as possible.  Monitor inputs and outputs     Gangrene associated with diabetes mellitus (HCC)- (present on admission)  Assessment & Plan  I will start Zosyn and Zyvox, follow on cultures and sensitivities  There is extensive soft tissue gas concerning for gangrene and necrosis  Orthopedics consulted.  Revised Cardiac Risk Index for Pre-Operative Risk score of:   2 points Class III Risk  The patient has a 10.1 % 30-day risk of death, MI, or cardiac arrest         Abnormal EKG- (present on admission)  Assessment & Plan  EKG shows sinus rhythm with a rate of 79, there is no ST elevation, there is flattening of T wave in lead I, II, III and aVF, there is T wave inversions in lead V3-V6.  QTc is 456.    I will place on continuous cardiac monitoring  I will check echocardiography    Patient will need a stress test when sepsis resolves, consider inpatient versus outpatient based on clinical course  Revised Cardiac Risk Index for Pre-Operative Risk score of:   2 points Class III Risk  The patient has a 10.1 % 30-day risk of death, MI, or cardiac arrest      Dehydration- (present on admission)  Assessment & Plan  Likely secondary to reduced oral intake, and increase in insensible loss due to  infection.  Encourage oral intake as tolerated, antiemetics as needed.  Intravenous hydration until adequate oral intake is achieved.     Anemia- (present on admission)  Assessment & Plan  No evidence of gross bleeding  Monitor hemoglobin. I will order follow-up CBC  Transfuse for a hemoglobin of less than or equal to 7     Type 2 diabetes mellitus with diabetic peripheral angiopathy and gangrene, without long-term current use of insulin (HCC)- (present on admission)  Assessment & Plan  With hyperglycemia  I will check a glycated hemoglobin    I will start short acting insulin for now  I will order Accu-Checks, hypoglycemia protocol  Adjust according to blood sugars trend.     Hyponatremia- (present on admission)  Assessment & Plan  Hypovolemic hyponatremia   I expect Na to improve with IVF       VTE prophylaxis: SCDs/TEDs

## 2023-12-21 NOTE — ASSESSMENT & PLAN NOTE
Likely secondary to reduced oral intake, and increase in insensible loss due to infection.  Encourage oral intake as tolerated, antiemetics as needed.  Intravenous hydration until adequate oral intake is achieved.

## 2023-12-21 NOTE — ASSESSMENT & PLAN NOTE
There could be an acute blood loss component due to recent surgery  Monitor for any signs of bleeding

## 2023-12-21 NOTE — ASSESSMENT & PLAN NOTE
With hyperglycemia  I will check a glycated hemoglobin    I will start short acting insulin for now  I will order Accu-Checks, hypoglycemia protocol  Adjust according to blood sugars trend.     Sugars under control

## 2023-12-21 NOTE — ED TRIAGE NOTES
"Dropped off for above complaints.     Chief Complaint   Patient presents with    Wound Infection     Pt states he was in shower and felt something bite him. Pt states he then noticed a brown spider wash down the drain. Pt started feeling sick and foot was swollen and \"then all of a sudden the swelling disappeared and foot shriveled up\". Foot appears necrotic infection     /83   Pulse (!) 106   Temp 36.4 °C (97.5 °F) (Temporal)   Resp 16   Ht 1.651 m (5' 5\")   Wt 72 kg (158 lb 11.7 oz)   SpO2 99%   BMI 26.41 kg/m²     Pt states bite occurred 4 days ago  "

## 2023-12-21 NOTE — ASSESSMENT & PLAN NOTE
"      12/22: Patient underwent: \"Right foot irrigation and excisional debridement down to bone, Right foot toe amputation of the fourth and fifth toes at the metatarsal phalangeal joint, Right foot wound VAC placement\" by orthopedic surgery yesterday.  Orthopedic surgery does not believe that the limb is salvageable and they are planning on potentially doing a BKA but this will not happen until most likely early next week.  Orthopedic surgery to discuss this with patient today.  Will continue with antibiotics for now.  Pending cultures.  Once we have any culture results we will consult ID.  For now we will continue with Zyvox and Zosyn.    12/24: Patient now more amenable to the possibility of amputation as this will likely be the only viable option for recovery based on length of antibiotic and as also the significant debridement that was necessary to dissect the gangrene.  Wound VAC in place and under this tendon and bone exposed.  Discuss further with orthopedic surgery    12/25: Patient in good spirits, he has come to terms with moving forward with amputation and does feel that this will give him the best opportunity for independence and resolution of infection.  Will discuss further with orthopedic surgery likely tomorrow.    12/26: Patient for BKA later today around 2, Dr Kang    12/27: Resolved with BKA  "

## 2023-12-21 NOTE — ASSESSMENT & PLAN NOTE
"I will start Zosyn and Zyvox, follow on cultures and sensitivities  There is extensive soft tissue gas concerning for gangrene and necrosis  Orthopedics consulted     12/22: Patient underwent: \"Right foot irrigation and excisional debridement down to bone, Right foot toe amputation of the fourth and fifth toes at the metatarsal phalangeal joint, Right foot wound VAC placement\" by orthopedic surgery yesterday.  Orthopedic surgery does not believe that the limb is salvageable and they are planning on potentially doing a BKA but this will not happen until most likely early next week.  Orthopedic surgery to discuss this with patient today.  Will continue with antibiotics for now.  Pending cultures.  Once we have any culture results we will consult ID.  For now we will continue with Zyvox and Zosyn.    12/24: Preoperative wound positive for Pasteurella and group B strep, per ID continue with Zosyn, appreciate consultation  12/25: Continue with Zosyn, patient will need further amputation  12/26: Patient for BKA today with Dr. Kang  12/27: Resolved with BKA    "

## 2023-12-22 PROBLEM — D72.829 LEUKOCYTOSIS: Status: ACTIVE | Noted: 2023-12-22

## 2023-12-22 PROBLEM — E83.42 HYPOMAGNESEMIA: Status: ACTIVE | Noted: 2023-12-22

## 2023-12-22 LAB
ALBUMIN SERPL BCP-MCNC: 1.7 G/DL (ref 3.2–4.9)
ALBUMIN/GLOB SERPL: 0.4 G/DL
ALP SERPL-CCNC: 111 U/L (ref 30–99)
ALT SERPL-CCNC: 8 U/L (ref 2–50)
ANION GAP SERPL CALC-SCNC: 12 MMOL/L (ref 7–16)
AST SERPL-CCNC: 10 U/L (ref 12–45)
BILIRUB SERPL-MCNC: 0.5 MG/DL (ref 0.1–1.5)
BUN SERPL-MCNC: 22 MG/DL (ref 8–22)
CALCIUM ALBUM COR SERPL-MCNC: 9.6 MG/DL (ref 8.5–10.5)
CALCIUM SERPL-MCNC: 7.8 MG/DL (ref 8.4–10.2)
CHLORIDE SERPL-SCNC: 98 MMOL/L (ref 96–112)
CO2 SERPL-SCNC: 20 MMOL/L (ref 20–33)
CREAT SERPL-MCNC: 1.35 MG/DL (ref 0.5–1.4)
ERYTHROCYTE [DISTWIDTH] IN BLOOD BY AUTOMATED COUNT: 38.8 FL (ref 35.9–50)
EST. AVERAGE GLUCOSE BLD GHB EST-MCNC: 223 MG/DL
GFR SERPLBLD CREATININE-BSD FMLA CKD-EPI: 64 ML/MIN/1.73 M 2
GLOBULIN SER CALC-MCNC: 4.4 G/DL (ref 1.9–3.5)
GLUCOSE BLD STRIP.AUTO-MCNC: 137 MG/DL (ref 65–99)
GLUCOSE BLD STRIP.AUTO-MCNC: 185 MG/DL (ref 65–99)
GLUCOSE BLD STRIP.AUTO-MCNC: 199 MG/DL (ref 65–99)
GLUCOSE BLD STRIP.AUTO-MCNC: 218 MG/DL (ref 65–99)
GLUCOSE SERPL-MCNC: 125 MG/DL (ref 65–99)
GRAM STN SPEC: NORMAL
HBA1C MFR BLD: 9.4 % (ref 4–5.6)
HCT VFR BLD AUTO: 31.3 % (ref 42–52)
HCT VFR BLD AUTO: 33.1 % (ref 42–52)
HCT VFR BLD AUTO: 35.5 % (ref 42–52)
HGB BLD-MCNC: 10.3 G/DL (ref 14–18)
HGB BLD-MCNC: 11 G/DL (ref 14–18)
HGB BLD-MCNC: 11.5 G/DL (ref 14–18)
LACTATE SERPL-SCNC: 0.9 MMOL/L (ref 0.5–2)
LACTATE SERPL-SCNC: 1.2 MMOL/L (ref 0.5–2)
MAGNESIUM SERPL-MCNC: 1.8 MG/DL (ref 1.5–2.5)
MCH RBC QN AUTO: 28.9 PG (ref 27–33)
MCHC RBC AUTO-ENTMCNC: 32.4 G/DL (ref 32.3–36.5)
MCV RBC AUTO: 89.2 FL (ref 81.4–97.8)
PLATELET # BLD AUTO: 230 K/UL (ref 164–446)
PMV BLD AUTO: 9.8 FL (ref 9–12.9)
POTASSIUM SERPL-SCNC: 3.8 MMOL/L (ref 3.6–5.5)
PROT SERPL-MCNC: 6.1 G/DL (ref 6–8.2)
RBC # BLD AUTO: 3.98 M/UL (ref 4.7–6.1)
SIGNIFICANT IND 70042: NORMAL
SITE SITE: NORMAL
SODIUM SERPL-SCNC: 130 MMOL/L (ref 135–145)
SOURCE SOURCE: NORMAL
WBC # BLD AUTO: 14.3 K/UL (ref 4.8–10.8)

## 2023-12-22 PROCEDURE — 770020 HCHG ROOM/CARE - TELE (206)

## 2023-12-22 PROCEDURE — 700111 HCHG RX REV CODE 636 W/ 250 OVERRIDE (IP): Mod: JZ | Performed by: HOSPITALIST

## 2023-12-22 PROCEDURE — 700102 HCHG RX REV CODE 250 W/ 637 OVERRIDE(OP): Performed by: HOSPITALIST

## 2023-12-22 PROCEDURE — 85027 COMPLETE CBC AUTOMATED: CPT

## 2023-12-22 PROCEDURE — 99233 SBSQ HOSP IP/OBS HIGH 50: CPT | Performed by: INTERNAL MEDICINE

## 2023-12-22 PROCEDURE — 700111 HCHG RX REV CODE 636 W/ 250 OVERRIDE (IP): Performed by: INTERNAL MEDICINE

## 2023-12-22 PROCEDURE — 85018 HEMOGLOBIN: CPT

## 2023-12-22 PROCEDURE — 80053 COMPREHEN METABOLIC PANEL: CPT

## 2023-12-22 PROCEDURE — 700102 HCHG RX REV CODE 250 W/ 637 OVERRIDE(OP): Performed by: INTERNAL MEDICINE

## 2023-12-22 PROCEDURE — 83735 ASSAY OF MAGNESIUM: CPT

## 2023-12-22 PROCEDURE — 85014 HEMATOCRIT: CPT

## 2023-12-22 PROCEDURE — 83605 ASSAY OF LACTIC ACID: CPT

## 2023-12-22 PROCEDURE — A9270 NON-COVERED ITEM OR SERVICE: HCPCS | Performed by: HOSPITALIST

## 2023-12-22 PROCEDURE — 94760 N-INVAS EAR/PLS OXIMETRY 1: CPT

## 2023-12-22 PROCEDURE — 700105 HCHG RX REV CODE 258: Performed by: HOSPITALIST

## 2023-12-22 PROCEDURE — 36415 COLL VENOUS BLD VENIPUNCTURE: CPT

## 2023-12-22 PROCEDURE — 82962 GLUCOSE BLOOD TEST: CPT | Mod: 91

## 2023-12-22 PROCEDURE — A9270 NON-COVERED ITEM OR SERVICE: HCPCS | Performed by: INTERNAL MEDICINE

## 2023-12-22 RX ORDER — LINEZOLID 600 MG/1
600 TABLET, FILM COATED ORAL EVERY 12 HOURS
Status: DISCONTINUED | OUTPATIENT
Start: 2023-12-22 | End: 2023-12-25

## 2023-12-22 RX ORDER — MAGNESIUM SULFATE 1 G/100ML
1 INJECTION INTRAVENOUS ONCE
Status: COMPLETED | OUTPATIENT
Start: 2023-12-22 | End: 2023-12-22

## 2023-12-22 RX ADMIN — INSULIN HUMAN 1 UNITS: 100 INJECTION, SOLUTION PARENTERAL at 17:57

## 2023-12-22 RX ADMIN — SODIUM CHLORIDE, POTASSIUM CHLORIDE, SODIUM LACTATE AND CALCIUM CHLORIDE: 600; 310; 30; 20 INJECTION, SOLUTION INTRAVENOUS at 11:14

## 2023-12-22 RX ADMIN — PIPERACILLIN AND TAZOBACTAM 4.5 G: 4; .5 INJECTION, POWDER, FOR SOLUTION INTRAVENOUS at 00:23

## 2023-12-22 RX ADMIN — INSULIN HUMAN 1 UNITS: 100 INJECTION, SOLUTION PARENTERAL at 13:19

## 2023-12-22 RX ADMIN — PIPERACILLIN AND TAZOBACTAM 4.5 G: 4; .5 INJECTION, POWDER, FOR SOLUTION INTRAVENOUS at 09:29

## 2023-12-22 RX ADMIN — INSULIN HUMAN 2 UNITS: 100 INJECTION, SOLUTION PARENTERAL at 20:23

## 2023-12-22 RX ADMIN — OXYCODONE HYDROCHLORIDE 2.5 MG: 5 TABLET ORAL at 03:11

## 2023-12-22 RX ADMIN — LINEZOLID 600 MG: 600 TABLET, FILM COATED ORAL at 17:46

## 2023-12-22 RX ADMIN — MAGNESIUM SULFATE IN DEXTROSE 1 G: 10 INJECTION, SOLUTION INTRAVENOUS at 05:53

## 2023-12-22 RX ADMIN — DOCUSATE SODIUM 50MG AND SENNOSIDES 8.6MG 2 TABLET: 8.6; 5 TABLET, FILM COATED ORAL at 05:50

## 2023-12-22 RX ADMIN — LINEZOLID 600 MG: 600 INJECTION, SOLUTION INTRAVENOUS at 06:22

## 2023-12-22 RX ADMIN — PIPERACILLIN AND TAZOBACTAM 4.5 G: 4; .5 INJECTION, POWDER, FOR SOLUTION INTRAVENOUS at 18:08

## 2023-12-22 ASSESSMENT — PAIN DESCRIPTION - PAIN TYPE
TYPE: ACUTE PAIN

## 2023-12-22 ASSESSMENT — ENCOUNTER SYMPTOMS
BLURRED VISION: 0
DIZZINESS: 0
VOMITING: 0
ABDOMINAL PAIN: 0
NERVOUS/ANXIOUS: 0
HEARTBURN: 0
BACK PAIN: 0
HEADACHES: 0
SHORTNESS OF BREATH: 0
COUGH: 0
PALPITATIONS: 0
DOUBLE VISION: 0

## 2023-12-22 ASSESSMENT — LIFESTYLE VARIABLES: SUBSTANCE_ABUSE: 0

## 2023-12-22 NOTE — OR NURSING
1924- Patient arrived from OR via rney.  R foot dressing CDI; Wound vac in place.    Sedation/Resp Status: Non responsive to verbal with OPA in place. Respirations spontaneous and non-labored.    HR 75SR; VSS on 6L 02 via simple mask.  No pain or nausea as evidence by sleeping.    1928- OPA out for return of spontaneous eye opening/gag reflex - respirations continue spontaneous and non-labored.     1935- No pain or nausea reported. The dressing is CDI.    1941- Called the patients family and gave an update.    1950- The patient reports 9/10 pain in the R foot. No nausea reported. The dressing is free of drainage.    1953- Medicated per MAR for 9/10 pain    2020- The patient reports tolerable 6/10 pain and no nausea. The dressing is CDI.    2022- Report called to Ashley FORBES    2023- Med hold complete.    2031- The patient was transported to room 310-2 via [facility] by two med/tele staff members. The patients two belongings bags were on the gurney. The cane was on the bed with the patient. VSS on RA.

## 2023-12-22 NOTE — ANESTHESIA POSTPROCEDURE EVALUATION
Patient: Nestor Mckenzie Jr.    Procedure Summary       Date: 12/21/23 Room / Location:  OR  / SURGERY [facility]    Anesthesia Start: 1839 Anesthesia Stop: 1926    Procedures:       AMPUTATION, TOE (Right)      IRRIGATION AND DEBRIDEMENT, WOUND, RIGHT FOOT Diagnosis:     Surgeons: Marco Antonio Kang M.D. Responsible Provider: Chance Ordoñez M.D.    Anesthesia Type: general ASA Status: 3 - Emergent            Final Anesthesia Type: general  Last vitals  BP   Blood Pressure: (!) 154/93    Temp   36 °C (96.8 °F)    Pulse   90   Resp   16    SpO2   97 %      Anesthesia Post Evaluation    Patient location during evaluation: PACU  Patient participation: complete - patient participated  Level of consciousness: sleepy but conscious    Airway patency: patent  Anesthetic complications: no  Cardiovascular status: hemodynamically stable  Respiratory status: acceptable  Hydration status: euvolemic    PONV: none          There were no known notable events for this encounter.     Nurse Pain Score: 0 (NPRS)

## 2023-12-22 NOTE — CARE PLAN
The patient is Watcher - Medium risk of patient condition declining or worsening    Shift Goals  Clinical Goals: pain control, IV fluids, IV antibiotics, i/o  Patient Goals: pain control    Progress made toward(s) clinical / shift goals:   Pain well managed overnight.  Mild swelling in RLE no drainage. Pulses heard with doppler on posterior tibial pulse and popliteal pulse.  Pt. Reports no numbness and tingling.  Problem: Pain - Standard  Goal: Alleviation of pain or a reduction in pain to the patient’s comfort goal  Outcome: Progressing     Problem: Knowledge Deficit - Standard  Goal: Patient and family/care givers will demonstrate understanding of plan of care, disease process/condition, diagnostic tests and medications  Outcome: Progressing     Problem: Hemodynamics  Goal: Patient's hemodynamics, fluid balance and neurologic status will be stable or improve  Outcome: Progressing     Problem: Fluid Volume  Goal: Fluid volume balance will be maintained  Outcome: Progressing     Problem: Urinary - Renal Perfusion  Goal: Ability to achieve and maintain adequate renal perfusion and functioning will improve  Outcome: Progressing     Problem: Respiratory  Goal: Patient will achieve/maintain optimum respiratory ventilation and gas exchange  Outcome: Progressing     Problem: Mechanical Ventilation  Goal: Safe management of artificial airway and ventilation  Outcome: Progressing  Goal: Successful weaning off mechanical ventilator, spontaneously maintains adequate gas exchange  Outcome: Progressing  Goal: Patient will be able to express needs and understand communication  Outcome: Progressing     Problem: Physical Regulation  Goal: Diagnostic test results will improve  Outcome: Progressing  Goal: Signs and symptoms of infection will decrease  Outcome: Progressing     Problem: Diabetes Management  Goal: Patient will achieve and maintain glucose in satisfactory range  Outcome: Progressing     Problem: Knowledge Deficit -  Diabetes  Goal: Patient will demonstrate knowledge of insulin injection, symptoms, and treatment of hypoglycemia and diet prior to discharge  Outcome: Progressing     Problem: Discharge Planning - Diabetes  Goal: Patient's continuum of care needs will be met  Outcome: Progressing     Problem: Skin Integrity - Diabetes  Goal: Patient's skin on legs and feet will remain intact while hospitalized  Outcome: Progressing     Problem: Infection - Diabetes  Goal: Patient will remain free from signs and symptoms of infection  Outcome: Progressing  Goal: Promotion wound healing, line and drain management  Outcome: Progressing     Problem: Fluid Balance or Risk for Fluid Volume Deficit  Goal: Patient will demonstrate adequate hydration and vital signs  Outcome: Progressing     Problem: Nutrition Deficit - Diabetes  Goal: Patient will demonstrate adequate hydration and vital signs  Outcome: Progressing     Problem: Diabetic Ulcer  Goal: Early identification of diabetic foot wound and initiation of appropriate interventions  Outcome: Progressing

## 2023-12-22 NOTE — PROGRESS NOTES
4 Eyes Skin Assessment Completed by harman arevalo, LEIDY and radhika robison RN.    Head WDL  Ears WDL  Nose WDL  Mouth WDL  Neck WDL  Breast/Chest WDL  Shoulder Blades WDL  Spine WDL  (R) Arm/Elbow/Hand WDL  (L) Arm/Elbow/Hand WDL  Abdomen WDL  Groin WDL  Scrotum/Coccyx/Buttocks WDL  (R) Leg Redness, Swelling, and Incision  (L) Leg WDL  (R) Heel/Foot/Toe Swelling and Edema  (L) Heel/Foot/Toe WDL          Devices In Places Blood Pressure Cuff and Pulse Ox      Interventions In Place Pillows    Possible Skin Injury Yes    Pictures Uploaded Into Epic no needs to be done  Wound Consult Placed Yes  RN Wound Prevention Protocol Ordered Yes

## 2023-12-22 NOTE — DISCHARGE PLANNING
Met with pt who stated that he lives alone, normally independent with ADLs and IADLs. He is uninsured and gave CM permission to send his name to hospitals for Medicaid Screening.     He does not have a PCP as well. Added BAILEY information on AVS (resources).    Care Transition Team Assessment    Information Source  Orientation Level: Oriented X4  Information Given By: Patient  Who is responsible for making decisions for patient? : Patient    Readmission Evaluation  Is this a readmission?: No    Elopement Risk  Legal Hold: No  Ambulatory or Self Mobile in Wheelchair: No-Not an Elopement Risk    Interdisciplinary Discharge Planning  Does Admitting Nurse Feel This Could be a Complex Discharge?: No  Primary Care Physician: no PCP  Lives with - Patient's Self Care Capacity: Alone and Able to Care For Self  Patient or legal guardian wants to designate a caregiver: No  Support Systems: None  Housing / Facility: 1 Story Apartment / Condo  Do You Take your Prescribed Medications Regularly: No  Reasons Why Not Taking Medications : Financial Reasons  Able to Return to Previous ADL's: Yes  Mobility Issues: No  Prior Services: Home-Independent  Patient Prefers to be Discharged to:: Home  Assistance Needed: No  Durable Medical Equipment: Not Applicable    Discharge Preparedness  What is your plan after discharge?: Home with help  What are your discharge supports?: Other (comment)  Prior Functional Level: Ambulatory, Drives Self, Needs Assist with Activities of Daily Living, Needs Assist with Medication Management  Difficulity with ADLs: Walking  Difficulity with IADLs: Driving    Functional Assesment  Prior Functional Level: Ambulatory, Drives Self, Needs Assist with Activities of Daily Living, Needs Assist with Medication Management    Finances  Financial Barriers to Discharge: No  Prescription Coverage: Yes    Vision / Hearing Impairment  Vision Impairment : Yes  Hearing Impairment : No    Values / Beliefs / Concerns  Values /  Beliefs Concerns : No    Advance Directive  Advance Directive?: None    Domestic Abuse  Have you ever been the victim of abuse or violence?: No         Discharge Risks or Barriers  Discharge risks or barriers?: Post-acute placement / services  Patient risk factors: Cognitive / sensory / physical deficit, Complex medical needs    Anticipated Discharge Information  Discharge Disposition: Discharged to home/self care (01)

## 2023-12-22 NOTE — ANESTHESIA PROCEDURE NOTES
Airway    Date/Time: 12/21/2023 6:43 PM    Performed by: Chance Ordoñez M.D.  Authorized by: Chance Ordñoez M.D.    Location:  OR  Urgency:  Elective  Difficult Airway: No    Indications for Airway Management:  Anesthesia      Spontaneous Ventilation: absent    Sedation Level:  Deep  Preoxygenated: Yes    Mask Difficulty Assessment:  0 - not attempted  Final Airway Type:  Supraglottic airway  Final Supraglottic Airway:  Standard LMA    SGA Size:  4  Number of Attempts at Approach:  1

## 2023-12-22 NOTE — CONSULTS
CC: right foot necrotic wound     HPI:   Patient is a 48-year-old male with history of borderline diabetes who presents with right foot wound.  He states that a few days ago he thinks he may have been bit by something in the shower.  He then reported worsening redness swelling and pain of the foot.  He has noticed his foot is black over the past few days.  It was not getting any better so he came in to have it looked at. Rates pain a 3-4 out of 10.    ROS: Positive for musculoskeletal pain and what is stated in the HPI and PMH, otherwise negative review of systems    PMH:   Past Medical History:   Diagnosis Date    Prediabetes        PSH:   History reviewed. No pertinent surgical history.    Allergies:   No Known Allergies    SH:   Lives at home  Works full-time job  Denies tobacco or alcohol    FH:   Family history is noncontributory    Physical Exam:   General: AO x4  Eyes: non-icteric  Breathing: nonlabored  MSK:   Right foot has full-thickness necrosis of the skin from the dorsum of the foot over the lateral aspect of the midfoot and forefoot and coursing down towards the fourth toe which is completely necrotic.  There is mild surrounding erythema and mild drainage.  He does have a warm well-perfused foot with good pulses  Sensation is grossly intact    Imaging:   X-ray shows gas in the soft tissue and possible irregularity of the distal fifth metatarsal    Assessment/Plan:   48-year-old male who presents with right necrotic foot wound.  Total but unclear what the etiology is but is likely connected to his elevated glucose but he thinks he also may have been bit by some type of spider or bug.  Regardless he has full-thickness necrosis of the dorsal skin of the foot 10 full-thickness necrosis of the fourth toe.  Additionally he has sepsis and has elevated inflammatory markers creatinine and lactic acid.  I discussed treatment options. Will plan for semiurgent debridement this afternoon.  Will plan for  amputation of the fourth and fifth toes as well given that the fourth toe is necrotic and nonviable and the fifth toe was almost entirely surrounded by necrotic tissue.  He will likely need serial debridements with possible skin grafting versus other flap coverage depending upon the extent of the wounds.  Will plan for debridement this evening

## 2023-12-22 NOTE — PROGRESS NOTES
"Hospital Medicine Daily Progress Note    Date of Service  12/22/2023    Chief Complaint  Nestor Mckenzie Jr. is a 48 y.o. male admitted 12/21/2023 with wound infection.    Hospital Course  As per chart review:  \"48 y.o. male with a past medical history of diabetes who presented 12/21/2023 with blackish discoloration of the right toe with pain, redness and swelling.  Patient reports that he has been feeling well until 4 days ago where he felt a spider bite on his right foot.  Since then he has been having progressively worsening pain redness, swelling eventually became black.  He also has fevers and chills.  In the emergency room the patient was noticed to be tachycardic with a heart rate of 106.  His labs are concerning for acute kidney injury with a creatinine of 1.67, and hyponatremia of 128 with an elevated lactic acid of 2.4.\"    Interval Problem Update  12/22: Patient seen at bedside this morning.  Patient underwent orthopedic surgery yesterday.  After reading orthopedics note it seems that patient might require BKA.  Orthopedic surgery to discuss this with the patient today.  For now we will continue antibiotics, pending cultures.  Pending repeat hemoglobin to see if we can start chemical DVT prophylaxis.    I have discussed this patient's plan of care and discharge plan at IDT rounds today with Case Management, Nursing, Nursing leadership, and other members of the IDT team.    Consultants/Specialty  orthopedics    Code Status  Full Code    Disposition  The patient is not medically cleared for discharge to home or a post-acute facility.      I have placed the appropriate orders for post-discharge needs.    Review of Systems  Review of Systems   Constitutional:  Positive for malaise/fatigue.   HENT:  Negative for hearing loss and nosebleeds.    Eyes:  Negative for blurred vision and double vision.   Respiratory:  Negative for cough and shortness of breath.    Cardiovascular:  Negative for chest pain and " palpitations.   Gastrointestinal:  Negative for abdominal pain, heartburn and vomiting.   Genitourinary:  Negative for dysuria and urgency.   Musculoskeletal:  Positive for joint pain. Negative for back pain.   Skin:  Negative for itching and rash.   Neurological:  Negative for dizziness and headaches.   Psychiatric/Behavioral:  Negative for substance abuse. The patient is not nervous/anxious.    All other systems reviewed and are negative.       Physical Exam  Temp:  [35.8 °C (96.5 °F)-37.6 °C (99.7 °F)] 37.6 °C (99.7 °F)  Pulse:  [] 108  Resp:  [14-18] 16  BP: (108-154)/(66-93) 133/73  SpO2:  [91 %-100 %] 91 %    Physical Exam  Vitals and nursing note reviewed.   Constitutional:       Appearance: He is ill-appearing.   HENT:      Head: Normocephalic and atraumatic.      Right Ear: External ear normal.      Left Ear: External ear normal.      Nose: Nose normal.      Mouth/Throat:      Mouth: Mucous membranes are moist.      Pharynx: Oropharynx is clear.   Eyes:      General:         Right eye: No discharge.         Left eye: No discharge.      Extraocular Movements: Extraocular movements intact.      Pupils: Pupils are equal, round, and reactive to light.   Cardiovascular:      Rate and Rhythm: Normal rate and regular rhythm.      Heart sounds: No murmur heard.  Pulmonary:      Effort: Pulmonary effort is normal. No respiratory distress.      Breath sounds: Normal breath sounds.   Abdominal:      General: Abdomen is flat. Bowel sounds are normal. There is no distension.      Palpations: Abdomen is soft.      Tenderness: There is no abdominal tenderness.   Musculoskeletal:      Cervical back: Normal range of motion and neck supple.      Right lower leg: No edema.      Left lower leg: No edema.      Comments: Dressing in place with wound vac   Skin:     General: Skin is warm.   Neurological:      General: No focal deficit present.      Mental Status: He is alert and oriented to person, place, and time.    Psychiatric:         Mood and Affect: Mood normal.         Behavior: Behavior normal.         Fluids    Intake/Output Summary (Last 24 hours) at 12/22/2023 1215  Last data filed at 12/22/2023 0700  Gross per 24 hour   Intake 1510 ml   Output 550 ml   Net 960 ml       Laboratory  Recent Labs     12/21/23  1150 12/22/23  0055 12/22/23  0622   WBC 14.9* 14.3*  --    RBC 4.32* 3.98*  --    HEMOGLOBIN 12.4* 11.5* 11.0*   HEMATOCRIT 36.7* 35.5* 33.1*   MCV 85.0 89.2  --    MCH 28.7 28.9  --    MCHC 33.8 32.4  --    RDW 36.9 38.8  --    PLATELETCT 288 230  --    MPV 9.7 9.8  --      Recent Labs     12/21/23  1150 12/22/23  0055   SODIUM 128* 130*   POTASSIUM 3.9 3.8   CHLORIDE 93* 98   CO2 21 20   GLUCOSE 192* 125*   BUN 24* 22   CREATININE 1.67* 1.35   CALCIUM 8.2* 7.8*                   Imaging  DX-FOOT-COMPLETE 3+ RIGHT   Final Result      1.  Acute fracture of the base of the fifth metatarsal.      2.  Extensive soft tissue gas involving the mid and forefoot bilaterally likely representing tissue necrosis/gangrene.      3.  Demineralization of the fifth metatarsal possibly representing presence of osteomyelitis.      DX-CHEST-PORTABLE (1 VIEW)   Final Result      No evidence of acute cardiopulmonary process.           Assessment/Plan  * Sepsis (HCC)- (present on admission)  Assessment & Plan  This is Sepsis Present on admission  SIRS criteria identified on my evaluation include: Tachycardia, with heart rate greater than 90 BPM, Tachypnea, with respirations greater than 20 per minute, and Leukocytosis, with WBC greater than 12,000  Clinical indicators of end organ dysfunction include Lactic Acid greater than 2 and Acute On Chronic Renal Failure, with creatinine >0.5 above baseline level  Source is cellulitis and gangrene of the right foot  Sepsis protocol initiated  Crystalloid Fluid Administration: Fluid resuscitation ordered per standard protocol - 30 mL/kg per current or ideal body weight  IV antibiotics as  "appropriate for source of sepsis  Reassessment: I have reassessed the patient's hemodynamic status    12/22: Continue antibiotics. Orthopedic surgery involved. Once culture results are back we will consult ID if needed.    Hypomagnesemia  Assessment & Plan  Replace as needed  monitor    Leukocytosis  Assessment & Plan  In the setting of infection  Monitor  Continue antibiotics.    Abnormal EKG- (present on admission)  Assessment & Plan  EKG shows sinus rhythm with a rate of 79, there is no ST elevation, there is flattening of T wave in lead I, II, III and aVF, there is T wave inversions in lead V3-V6.  QTc is 456.    I will place on continuous cardiac monitoring  I will check echocardiography    Patient will need a stress test when sepsis resolves, consider inpatient versus outpatient based on clinical course  Revised Cardiac Risk Index for Pre-Operative Risk score of:   2 points Class III Risk  The patient has a 10.1 % 30-day risk of death, MI, or cardiac arrest      12/22: Patient denying any chest pain.    Cellulitis of right lower extremity- (present on admission)  Assessment & Plan  I will start Zosyn and Zyvox, follow on cultures and sensitivities  There is extensive soft tissue gas concerning for gangrene and necrosis  Orthopedics consulted     12/22: Patient underwent: \"Right foot irrigation and excisional debridement down to bone, Right foot toe amputation of the fourth and fifth toes at the metatarsal phalangeal joint, Right foot wound VAC placement\" by orthopedic surgery yesterday.  Orthopedic surgery does not believe that the limb is salvageable and they are planning on potentially doing a BKA but this will not happen until most likely early next week.  Orthopedic surgery to discuss this with patient today.  Will continue with antibiotics for now.  Pending cultures.  Once we have any culture results we will consult ID.  For now we will continue with Zyvox and Zosyn.    Dehydration- (present on " "admission)  Assessment & Plan  Likely secondary to reduced oral intake, and increase in insensible loss due to infection.  Encourage oral intake as tolerated, antiemetics as needed.  Intravenous hydration until adequate oral intake is achieved.     Anemia- (present on admission)  Assessment & Plan  There could be an acute blood loss component due to recent surgery  Monitor for any signs of bleeding      Acute hematogenous osteomyelitis of right foot (HCC)- (present on admission)  Assessment & Plan  I will start Zosyn and Zyvox, follow on cultures and sensitivities  There is extensive soft tissue gas concerning for gangrene and necrosis  Orthopedics consulted.  Revised Cardiac Risk Index for Pre-Operative Risk score of:   2 points Class III Risk  The patient has a 10.1 % 30-day risk of death, MI, or cardiac arrest     12/22: Patient underwent: \"Right foot irrigation and excisional debridement down to bone, Right foot toe amputation of the fourth and fifth toes at the metatarsal phalangeal joint, Right foot wound VAC placement\" by orthopedic surgery yesterday.  Orthopedic surgery does not believe that the limb is salvageable and they are planning on potentially doing a BKA but this will not happen until most likely early next week.  Orthopedic surgery to discuss this with patient today.  Will continue with antibiotics for now.  Pending cultures.  Once we have any culture results we will consult ID.  For now we will continue with Zyvox and Zosyn.    Fracture of the fifth metatarsal- (present on admission)  Assessment & Plan  Orthopedics consulted   Revised Cardiac Risk Index for Pre-Operative Risk score of:   2 points Class III Risk  The patient has a 10.1 % 30-day risk of death, MI, or cardiac arrest   Multimodal pain control  Consider physical and Occupational Therapy, pharmacologic prophylaxis when okay with orthopedics     12/22: Patient underwent: \"Right foot irrigation and excisional debridement down to bone, " "Right foot toe amputation of the fourth and fifth toes at the metatarsal phalangeal joint, Right foot wound VAC placement\" by orthopedic surgery yesterday.  Orthopedic surgery does not believe that the limb is salvageable and they are planning on potentially doing a BKA but this will not happen until most likely early next week.  Orthopedic surgery to discuss this with patient today.  Will continue with antibiotics for now.  Pending cultures.  Once we have any culture results we will consult ID.  For now we will continue with Zyvox and Zosyn.    Type 2 diabetes mellitus with diabetic peripheral angiopathy and gangrene, without long-term current use of insulin (HCC)- (present on admission)  Assessment & Plan  With hyperglycemia  I will check a glycated hemoglobin    I will start short acting insulin for now  I will order Accu-Checks, hypoglycemia protocol  Adjust according to blood sugars trend.     12/22: Continue ISS for now monitor. Pending A1c.    SRIRAM (acute kidney injury) (HCC)- (present on admission)  Assessment & Plan  Mostly due to sepsis  I will start intravenous fluids  Avoid / minimize nephrotoxins as much as possible.  Monitor inputs and outputs     12/22: Continue IVF for now. Cr seems to be improving.    Hyponatremia- (present on admission)  Assessment & Plan  Seems to be improving  Continue with IVF for now    Gangrene associated with diabetes mellitus (HCC)- (present on admission)  Assessment & Plan  I will start Zosyn and Zyvox, follow on cultures and sensitivities  There is extensive soft tissue gas concerning for gangrene and necrosis  Orthopedics consulted.  Revised Cardiac Risk Index for Pre-Operative Risk score of:   2 points Class III Risk  The patient has a 10.1 % 30-day risk of death, MI, or cardiac arrest         12/22: Patient underwent: \"Right foot irrigation and excisional debridement down to bone, Right foot toe amputation of the fourth and fifth toes at the metatarsal phalangeal joint, " "Right foot wound VAC placement\" by orthopedic surgery yesterday.  Orthopedic surgery does not believe that the limb is salvageable and they are planning on potentially doing a BKA but this will not happen until most likely early next week.  Orthopedic surgery to discuss this with patient today.  Will continue with antibiotics for now.  Pending cultures.  Once we have any culture results we will consult ID.  For now we will continue with Zyvox and Zosyn.         VTE prophylaxis: SCDs (until repeat Hb to make sure it is not dropping)    I have performed a physical exam and reviewed and updated ROS and Plan today (12/22/2023). In review of yesterday's note (12/21/2023), there are no changes except as documented above.      I spend at least 51 minutes providing care for this patient.  This included face-to-face interview, physical examination.  Review of lab work including CBC, CMP, magnesium.  Review of operative note from orthopedic surgery.  Discussion with multidisciplinary team including case management, nursing staff and pharmacy.  Creating plan of care, reviewing orders.    "

## 2023-12-22 NOTE — ANESTHESIA TIME REPORT
Anesthesia Start and Stop Event Times       Date Time Event    12/21/2023 1829 Ready for Procedure     1839 Anesthesia Start     1926 Anesthesia Stop          Responsible Staff  12/21/23      Name Role Begin End    Chance Ordoñez M.D. Anesth 1839 1926          Overtime Reason:  no overtime (within assigned shift)    Comments:

## 2023-12-22 NOTE — PROGRESS NOTES
Telemetry Summary     Rhythm: SR  Rate: 78-93  Ectopy: n/a  Measurements: .12/.08/.38    Normal Values   Rhythm: SR  HR Range:   Measurement: 0.12-0.20/0.06-0.10/0.30-0.52

## 2023-12-22 NOTE — ANESTHESIA PREPROCEDURE EVALUATION
Case: [id number] Date/Time: 12/21/23 1815    Procedures:       AMPUTATION, TOE (Right)      IRRIGATION AND DEBRIDEMENT, WOUND, RIGHT FOOT    Location:  OR  / SURGERY [facility]    Surgeons: Marco Antonio Kang M.D.            Relevant Problems   CARDIAC   (positive) Type 2 diabetes mellitus with diabetic peripheral angiopathy and gangrene, without long-term current use of insulin (HCC)         (positive) SRIRAM (acute kidney injury) (HCC)      ENDO   (positive) Type 2 diabetes mellitus with diabetic peripheral angiopathy and gangrene, without long-term current use of insulin (HCC)      Other   (positive) Acute hematogenous osteomyelitis of right foot (HCC)   (positive) Anemia   (positive) Cellulitis of right lower extremity   (positive) Dehydration   (positive) Fracture of the fifth metatarsal   (positive) Gangrene associated with diabetes mellitus (HCC)   (positive) Hyponatremia   (positive) Sepsis (HCC)       Physical Exam    Airway   Mallampati: II  TM distance: >3 FB  Neck ROM: full       Cardiovascular - normal exam  Rhythm: regular  Rate: normal  (-) murmur     Dental - normal exam           Pulmonary - normal exam  Breath sounds clear to auscultation     Abdominal    Neurological - normal exam                   Anesthesia Plan    ASA 3- EMERGENT   ASA physical status 3 criteria: diabetes - poorly controlledASA physical status emergent criteria: sepsis and acutely contaminated wound or identified infection source    Plan - general       Airway plan will be LMA          Induction: intravenous    Postoperative Plan: Postoperative administration of opioids is intended.    Pertinent diagnostic labs and testing reviewed    Informed Consent:    Anesthetic plan and risks discussed with patient.

## 2023-12-22 NOTE — OP REPORT
DATE OF OPERATION: 12/21/2023     SURGEON: Marco Antonio Kang M.D.    ANESTHESIOLOGIST: Anesthesiologist: Chance Ordoñez M.D.    ANESTHESIA: General    SURGICAL FIRST ASST: None    PREOPERATIVE DIAGNOSIS: Right foot gangrene    POSTOPERATIVE DIAGNOSIS: Right foot gangrene    PROCEDURE:   Right foot irrigation and excisional debridement down to bone, wound size 8 cm x 8 cm  Right foot toe amputation of the fourth and fifth toes at the metatarsal phalangeal joint  Right foot wound VAC placement, 8 x 8 cm    DETAILS OF PROCEDURE:   The patient was met in the preoperative area the right side was marked as correct operative side.  Respective discussion was had and consent was signed.  He was brought to the operating metallic from patient, laterality, and procedure.  Anesthesia was induced and he was established.  The limb was prepped and draped in normal sterile surgical fashion.  Surgery was begun by sharp blade dissecting out the necrotic eschar on the dorsal aspect of the foot.  Once the necrotic eschar was unroofed we came down onto the dorsum of the foot which was entirely necrotic all of the skin and subcutaneous tissue and fat was soupy and necrotic and all of the extensor tendons were diseased and necrotic as well.  We ellipsed out all the necrotic tissue in the dorsum of the foot and essentially were down to bare metatarsals at this point.  The fourth and fifth toes which were gangrenous were amputated at the MTP joint.  The gangrene tracked plantarly the distal aspect of the flap and plantarly underneath the fifth and fourth metatarsals.  We undermined underneath the bottom of the foot in order to get out some of the gangrenous tissue at the fifth and fourth metatarsals.  Once we had all of the obvious dead tissue removed from the wound and cultures have been taken the wound was irrigated well with normal saline.  A wound VAC was placed on the entirety of the wound which measured about 8 x 8 cm    COMPLICATIONS:  None    POST OP PLAN:   Nonweightbearing right lower extremity  Follow cultures and infectious disease consult for antibiotic recommendations  Unfortunately due to the extensive amount of gangrene of the dorsal and plantar soft tissues and amount of soft tissue loss I do not see any viable route for limb salvage for this patient and I believe the bony amputation would likely be his best option for return to function.  He has severe gangrene and soft tissue loss and I do not see any route for limb preservation at this point.  I will discuss this with the patient likely tomorrow and can potentially plan for below-knee amputation on a delayed basis likely early next week after we have some time to treat as an infection and improve his overall labs  ____________________________________   Marco Antonio Kang M.D.

## 2023-12-22 NOTE — OR NURSING
1654: Pt brought to pre-op holding via gurney by RN, tolerated the transfer well. No pain or nausea, awake and alert, on room air, tolerating it well. Report rvd from LEIDY Adan prior to pt's arrival.  1714: Patient allergies and NPO status verified, home medication reconciliation completed and belongings secured. Patient verbalizes understanding of pain scale, expected course of stay and plan of care. Surgical site verified with patient. PIV patency verified. Sequentials placed on legs.

## 2023-12-23 LAB
ALBUMIN SERPL BCP-MCNC: 1.6 G/DL (ref 3.2–4.9)
ALBUMIN/GLOB SERPL: 0.4 G/DL
ALP SERPL-CCNC: 154 U/L (ref 30–99)
ALT SERPL-CCNC: 8 U/L (ref 2–50)
ANION GAP SERPL CALC-SCNC: 9 MMOL/L (ref 7–16)
AST SERPL-CCNC: 11 U/L (ref 12–45)
BACTERIA WND AEROBE CULT: ABNORMAL
BILIRUB SERPL-MCNC: 0.4 MG/DL (ref 0.1–1.5)
BUN SERPL-MCNC: 15 MG/DL (ref 8–22)
CALCIUM ALBUM COR SERPL-MCNC: 9.1 MG/DL (ref 8.5–10.5)
CALCIUM SERPL-MCNC: 7.2 MG/DL (ref 8.4–10.2)
CHLORIDE SERPL-SCNC: 97 MMOL/L (ref 96–112)
CO2 SERPL-SCNC: 24 MMOL/L (ref 20–33)
CREAT SERPL-MCNC: 1.39 MG/DL (ref 0.5–1.4)
ERYTHROCYTE [DISTWIDTH] IN BLOOD BY AUTOMATED COUNT: 39 FL (ref 35.9–50)
FUNGUS SPEC FUNGUS STN: NORMAL
FUNGUS SPEC FUNGUS STN: NORMAL
GFR SERPLBLD CREATININE-BSD FMLA CKD-EPI: 62 ML/MIN/1.73 M 2
GLOBULIN SER CALC-MCNC: 3.6 G/DL (ref 1.9–3.5)
GLUCOSE BLD STRIP.AUTO-MCNC: 155 MG/DL (ref 65–99)
GLUCOSE BLD STRIP.AUTO-MCNC: 162 MG/DL (ref 65–99)
GLUCOSE BLD STRIP.AUTO-MCNC: 193 MG/DL (ref 65–99)
GLUCOSE BLD STRIP.AUTO-MCNC: 217 MG/DL (ref 65–99)
GLUCOSE SERPL-MCNC: 153 MG/DL (ref 65–99)
GRAM STN SPEC: ABNORMAL
GRAM STN SPEC: NORMAL
HCT VFR BLD AUTO: 29.2 % (ref 42–52)
HGB BLD-MCNC: 9.7 G/DL (ref 14–18)
MAGNESIUM SERPL-MCNC: 1.7 MG/DL (ref 1.5–2.5)
MCH RBC QN AUTO: 29 PG (ref 27–33)
MCHC RBC AUTO-ENTMCNC: 33.2 G/DL (ref 32.3–36.5)
MCV RBC AUTO: 87.4 FL (ref 81.4–97.8)
PLATELET # BLD AUTO: 244 K/UL (ref 164–446)
PMV BLD AUTO: 10 FL (ref 9–12.9)
POTASSIUM SERPL-SCNC: 3.9 MMOL/L (ref 3.6–5.5)
PROT SERPL-MCNC: 5.2 G/DL (ref 6–8.2)
RBC # BLD AUTO: 3.34 M/UL (ref 4.7–6.1)
SIGNIFICANT IND 70042: ABNORMAL
SIGNIFICANT IND 70042: NORMAL
SITE SITE: ABNORMAL
SITE SITE: NORMAL
SODIUM SERPL-SCNC: 130 MMOL/L (ref 135–145)
SOURCE SOURCE: ABNORMAL
SOURCE SOURCE: NORMAL
WBC # BLD AUTO: 11.6 K/UL (ref 4.8–10.8)

## 2023-12-23 PROCEDURE — 97605 NEG PRS WND THER DME<=50SQCM: CPT

## 2023-12-23 PROCEDURE — 700102 HCHG RX REV CODE 250 W/ 637 OVERRIDE(OP): Performed by: INTERNAL MEDICINE

## 2023-12-23 PROCEDURE — 82962 GLUCOSE BLOOD TEST: CPT | Mod: 91

## 2023-12-23 PROCEDURE — 700102 HCHG RX REV CODE 250 W/ 637 OVERRIDE(OP): Performed by: HOSPITALIST

## 2023-12-23 PROCEDURE — 97162 PT EVAL MOD COMPLEX 30 MIN: CPT

## 2023-12-23 PROCEDURE — 85027 COMPLETE CBC AUTOMATED: CPT

## 2023-12-23 PROCEDURE — 770020 HCHG ROOM/CARE - TELE (206)

## 2023-12-23 PROCEDURE — 80053 COMPREHEN METABOLIC PANEL: CPT

## 2023-12-23 PROCEDURE — A9270 NON-COVERED ITEM OR SERVICE: HCPCS | Performed by: HOSPITALIST

## 2023-12-23 PROCEDURE — A9270 NON-COVERED ITEM OR SERVICE: HCPCS | Performed by: INTERNAL MEDICINE

## 2023-12-23 PROCEDURE — 83735 ASSAY OF MAGNESIUM: CPT

## 2023-12-23 PROCEDURE — 36415 COLL VENOUS BLD VENIPUNCTURE: CPT

## 2023-12-23 PROCEDURE — 94760 N-INVAS EAR/PLS OXIMETRY 1: CPT

## 2023-12-23 PROCEDURE — 700105 HCHG RX REV CODE 258: Performed by: HOSPITALIST

## 2023-12-23 PROCEDURE — 700111 HCHG RX REV CODE 636 W/ 250 OVERRIDE (IP): Mod: JZ | Performed by: HOSPITALIST

## 2023-12-23 PROCEDURE — 99232 SBSQ HOSP IP/OBS MODERATE 35: CPT | Performed by: INTERNAL MEDICINE

## 2023-12-23 RX ADMIN — PIPERACILLIN AND TAZOBACTAM 4.5 G: 4; .5 INJECTION, POWDER, FOR SOLUTION INTRAVENOUS at 17:50

## 2023-12-23 RX ADMIN — PIPERACILLIN AND TAZOBACTAM 4.5 G: 4; .5 INJECTION, POWDER, FOR SOLUTION INTRAVENOUS at 01:56

## 2023-12-23 RX ADMIN — OXYCODONE HYDROCHLORIDE 5 MG: 5 TABLET ORAL at 15:47

## 2023-12-23 RX ADMIN — INSULIN HUMAN 1 UNITS: 100 INJECTION, SOLUTION PARENTERAL at 12:13

## 2023-12-23 RX ADMIN — INSULIN HUMAN 1 UNITS: 100 INJECTION, SOLUTION PARENTERAL at 17:52

## 2023-12-23 RX ADMIN — PIPERACILLIN AND TAZOBACTAM 4.5 G: 4; .5 INJECTION, POWDER, FOR SOLUTION INTRAVENOUS at 10:47

## 2023-12-23 RX ADMIN — SODIUM CHLORIDE, POTASSIUM CHLORIDE, SODIUM LACTATE AND CALCIUM CHLORIDE: 600; 310; 30; 20 INJECTION, SOLUTION INTRAVENOUS at 17:44

## 2023-12-23 RX ADMIN — LINEZOLID 600 MG: 600 TABLET, FILM COATED ORAL at 17:50

## 2023-12-23 RX ADMIN — INSULIN HUMAN 2 UNITS: 100 INJECTION, SOLUTION PARENTERAL at 20:12

## 2023-12-23 RX ADMIN — LINEZOLID 600 MG: 600 TABLET, FILM COATED ORAL at 05:47

## 2023-12-23 RX ADMIN — INSULIN HUMAN 1 UNITS: 100 INJECTION, SOLUTION PARENTERAL at 06:37

## 2023-12-23 ASSESSMENT — ENCOUNTER SYMPTOMS
SHORTNESS OF BREATH: 0
CHILLS: 0
NERVOUS/ANXIOUS: 0
PHOTOPHOBIA: 0
DEPRESSION: 0
DIZZINESS: 0
MYALGIAS: 0
HEARTBURN: 0
COUGH: 0
DIARRHEA: 0
CONSTIPATION: 0
HEADACHES: 0
INSOMNIA: 0
BLURRED VISION: 0
SPEECH CHANGE: 0
CLAUDICATION: 0
FEVER: 0
WEAKNESS: 0
VOMITING: 0
ABDOMINAL PAIN: 0
SENSORY CHANGE: 0

## 2023-12-23 ASSESSMENT — PAIN DESCRIPTION - PAIN TYPE
TYPE: ACUTE PAIN

## 2023-12-23 NOTE — PROGRESS NOTES
S: pain controlled    O: wound vac holding suction    A/P  S/p R foot extensive debridement for necrotizing diabetic foot infection.  -Foot is stable today and wound vacs holding suction  -Unfortunately due to the amount of soft tissue necrosis I think a below-knee amputation would be his best bet at a functional limb and good outcome.  I discussed this with him at length.  He would like to take some time to think about his options.  Will treat systemic infection with IV antibiotics in the meantime and will plan for likely bony amputation early next week

## 2023-12-23 NOTE — HOSPITAL COURSE
Patient is a 48-year-old male with diabetes who presented with black discoloration of the right toe with pain, redness and swelling.  Been doing well until 4 days prior when he thought he felt a spider bite on his right foot and since that time has had worsening pain redness with swelling.  He presented to the emergency room and had acute kidney injury, hyponatremia and elevated lactic acid consistent with sepsis.  Orthopedic surgery was consulted given the extensive nature of the necrosis.  He had large amount of debridement on 12/22 and tolerated the procedure well.  At this time patient is hoping to preserve as much of his foot as possible and is not interested in BKA unless it comes to know other options.

## 2023-12-23 NOTE — PROGRESS NOTES
Hospital Medicine Daily Progress Note    Date of Service  12/23/2023    Chief Complaint  Nestor Mckenzie Jr. is a 48 y.o. male admitted 12/21/2023 with black lesion on his right foot    Hospital Course  Patient is a 48-year-old male with diabetes who presented with black discoloration of the right toe with pain, redness and swelling.  Been doing well until 4 days prior when he thought he felt a spider bite on his right foot and since that time has had worsening pain redness with swelling.  He presented to the emergency room and had acute kidney injury, hyponatremia and elevated lactic acid consistent with sepsis.  Orthopedic surgery was consulted given the extensive nature of the necrosis.  He had large amount of debridement on 12/22 and tolerated the procedure well.  At this time patient is hoping to preserve as much of his foot as possible and is not interested in BKA unless it comes to know other options.    Interval Problem Update  12/23 patient is postop day 1 from extensive debridement of the right foot with wound VAC placement.  Patient states that he wants to save as much of his foot as possible and is not interested in BKA unless he tries all other options and is not prove successful.    I have discussed this patient's plan of care and discharge plan at IDT rounds today with Case Management, Nursing, Nursing leadership, and other members of the IDT team.    Consultants/Specialty  orthopedics    Code Status  Full Code    Disposition  The patient is not medically cleared for discharge to home or a post-acute facility.  Anticipate discharge to: home with close outpatient follow-up    I have placed the appropriate orders for post-discharge needs.    Review of Systems  Review of Systems   Constitutional:  Negative for chills and fever.   HENT:  Negative for congestion.    Eyes:  Negative for blurred vision and photophobia.   Respiratory:  Negative for cough and shortness of breath.    Cardiovascular:   Negative for chest pain, claudication and leg swelling.   Gastrointestinal:  Negative for abdominal pain, constipation, diarrhea, heartburn and vomiting.   Genitourinary:  Negative for dysuria and hematuria.   Musculoskeletal:  Negative for joint pain and myalgias.   Skin:  Negative for itching and rash.   Neurological:  Negative for dizziness, sensory change, speech change, weakness and headaches.   Psychiatric/Behavioral:  Negative for depression. The patient is not nervous/anxious and does not have insomnia.         Physical Exam  Temp:  [36.8 °C (98.2 °F)-37.3 °C (99.2 °F)] 36.8 °C (98.2 °F)  Pulse:  [] 90  Resp:  [16-18] 18  BP: (141-163)/(77-88) 143/88  SpO2:  [91 %-98 %] 97 %    Physical Exam  Vitals and nursing note reviewed.   Constitutional:       General: He is not in acute distress.     Appearance: Normal appearance.   HENT:      Head: Normocephalic and atraumatic.   Eyes:      General: No scleral icterus.     Extraocular Movements: Extraocular movements intact.   Cardiovascular:      Rate and Rhythm: Normal rate and regular rhythm.      Pulses: Normal pulses.      Heart sounds: Normal heart sounds. No murmur heard.  Pulmonary:      Effort: Pulmonary effort is normal. No respiratory distress.      Breath sounds: Normal breath sounds. No wheezing, rhonchi or rales.   Abdominal:      General: Abdomen is flat. Bowel sounds are normal. There is no distension.      Palpations: Abdomen is soft.      Tenderness: There is no rebound.   Musculoskeletal:         General: No swelling or tenderness.      Cervical back: Normal range of motion and neck supple.   Lymphadenopathy:      Cervical: No cervical adenopathy.   Skin:     Coloration: Skin is not jaundiced.      Findings: No erythema.      Comments: Right foot dressed and wound VAC in place, patient able to move toes, sensation intact   Neurological:      General: No focal deficit present.      Mental Status: He is alert and oriented to person, place, and  time. Mental status is at baseline.      Cranial Nerves: No cranial nerve deficit.   Psychiatric:         Mood and Affect: Mood normal.         Behavior: Behavior normal.         Fluids    Intake/Output Summary (Last 24 hours) at 12/23/2023 1411  Last data filed at 12/23/2023 0547  Gross per 24 hour   Intake --   Output 900 ml   Net -900 ml       Laboratory  Recent Labs     12/21/23  1150 12/22/23  0055 12/22/23  0622 12/22/23  1711 12/23/23  0126   WBC 14.9* 14.3*  --   --  11.6*   RBC 4.32* 3.98*  --   --  3.34*   HEMOGLOBIN 12.4* 11.5* 11.0* 10.3* 9.7*   HEMATOCRIT 36.7* 35.5* 33.1* 31.3* 29.2*   MCV 85.0 89.2  --   --  87.4   MCH 28.7 28.9  --   --  29.0   MCHC 33.8 32.4  --   --  33.2   RDW 36.9 38.8  --   --  39.0   PLATELETCT 288 230  --   --  244   MPV 9.7 9.8  --   --  10.0     Recent Labs     12/21/23  1150 12/22/23  0055 12/23/23  0126   SODIUM 128* 130* 130*   POTASSIUM 3.9 3.8 3.9   CHLORIDE 93* 98 97   CO2 21 20 24   GLUCOSE 192* 125* 153*   BUN 24* 22 15   CREATININE 1.67* 1.35 1.39   CALCIUM 8.2* 7.8* 7.2*                   Imaging  DX-FOOT-COMPLETE 3+ RIGHT   Final Result      1.  Acute fracture of the base of the fifth metatarsal.      2.  Extensive soft tissue gas involving the mid and forefoot bilaterally likely representing tissue necrosis/gangrene.      3.  Demineralization of the fifth metatarsal possibly representing presence of osteomyelitis.      DX-CHEST-PORTABLE (1 VIEW)   Final Result      No evidence of acute cardiopulmonary process.           Assessment/Plan  * Sepsis (HCC)- (present on admission)  Assessment & Plan  This is Sepsis Present on admission  SIRS criteria identified on my evaluation include: Tachycardia, with heart rate greater than 90 BPM, Tachypnea, with respirations greater than 20 per minute, and Leukocytosis, with WBC greater than 12,000  Clinical indicators of end organ dysfunction include Lactic Acid greater than 2 and Acute On Chronic Renal Failure, with creatinine  ">0.5 above baseline level  Source is cellulitis and gangrene of the right foot  Sepsis protocol initiated  Crystalloid Fluid Administration: Fluid resuscitation ordered per standard protocol - 30 mL/kg per current or ideal body weight  IV antibiotics as appropriate for source of sepsis  Reassessment: I have reassessed the patient's hemodynamic status    12/22: Continue antibiotics. Orthopedic surgery involved. Once culture results are back we will consult ID if needed.    Hypomagnesemia  Assessment & Plan  Replace as needed  monitor    Leukocytosis  Assessment & Plan  In the setting of infection  Monitor  Continue antibiotics.    Abnormal EKG- (present on admission)  Assessment & Plan  Patient states he feels well  No need for echo    Cellulitis of right lower extremity- (present on admission)  Assessment & Plan  I will start Zosyn and Zyvox, follow on cultures and sensitivities  There is extensive soft tissue gas concerning for gangrene and necrosis  Orthopedics consulted     12/22: Patient underwent: \"Right foot irrigation and excisional debridement down to bone, Right foot toe amputation of the fourth and fifth toes at the metatarsal phalangeal joint, Right foot wound VAC placement\" by orthopedic surgery yesterday.  Orthopedic surgery does not believe that the limb is salvageable and they are planning on potentially doing a BKA but this will not happen until most likely early next week.  Orthopedic surgery to discuss this with patient today.  Will continue with antibiotics for now.  Pending cultures.  Once we have any culture results we will consult ID.  For now we will continue with Zyvox and Zosyn.    Dehydration- (present on admission)  Assessment & Plan  Likely secondary to reduced oral intake, and increase in insensible loss due to infection.  Encourage oral intake as tolerated, antiemetics as needed.  Intravenous hydration until adequate oral intake is achieved.     Anemia- (present on " "admission)  Assessment & Plan  There could be an acute blood loss component due to recent surgery  Monitor for any signs of bleeding      Acute hematogenous osteomyelitis of right foot (HCC)- (present on admission)  Assessment & Plan  I will start Zosyn and Zyvox, follow on cultures and sensitivities  There is extensive soft tissue gas concerning for gangrene and necrosis  Orthopedics consulted.  Revised Cardiac Risk Index for Pre-Operative Risk score of:   2 points Class III Risk  The patient has a 10.1 % 30-day risk of death, MI, or cardiac arrest     12/22: Patient underwent: \"Right foot irrigation and excisional debridement down to bone, Right foot toe amputation of the fourth and fifth toes at the metatarsal phalangeal joint, Right foot wound VAC placement\" by orthopedic surgery yesterday.  Orthopedic surgery does not believe that the limb is salvageable and they are planning on potentially doing a BKA but this will not happen until most likely early next week.  Orthopedic surgery to discuss this with patient today.  Will continue with antibiotics for now.  Pending cultures.  Once we have any culture results we will consult ID.  For now we will continue with Zyvox and Zosyn.    Fracture of the fifth metatarsal- (present on admission)  Assessment & Plan  Orthopedics consulted   Revised Cardiac Risk Index for Pre-Operative Risk score of:   2 points Class III Risk  The patient has a 10.1 % 30-day risk of death, MI, or cardiac arrest   Multimodal pain control  Consider physical and Occupational Therapy, pharmacologic prophylaxis when okay with orthopedics     12/22: Patient underwent: \"Right foot irrigation and excisional debridement down to bone, Right foot toe amputation of the fourth and fifth toes at the metatarsal phalangeal joint, Right foot wound VAC placement\" by orthopedic surgery yesterday.  Orthopedic surgery does not believe that the limb is salvageable and they are planning on potentially doing a BKA " "but this will not happen until most likely early next week.  Orthopedic surgery to discuss this with patient today.  Will continue with antibiotics for now.  Pending cultures.  Once we have any culture results we will consult ID.  For now we will continue with Zyvox and Zosyn.    Type 2 diabetes mellitus with diabetic peripheral angiopathy and gangrene, without long-term current use of insulin (HCC)- (present on admission)  Assessment & Plan  With hyperglycemia  I will check a glycated hemoglobin    I will start short acting insulin for now  I will order Accu-Checks, hypoglycemia protocol  Adjust according to blood sugars trend.     12/22: Continue ISS for now monitor. Pending A1c.    SRIRAM (acute kidney injury) (HCC)- (present on admission)  Assessment & Plan  Mostly due to sepsis  I will start intravenous fluids  Avoid / minimize nephrotoxins as much as possible.  Monitor inputs and outputs     12/22: Continue IVF for now. Cr seems to be improving.    Hyponatremia- (present on admission)  Assessment & Plan  Seems to be improving  Continue with IVF for now    Gangrene associated with diabetes mellitus (HCC)- (present on admission)  Assessment & Plan  I will start Zosyn and Zyvox, follow on cultures and sensitivities  There is extensive soft tissue gas concerning for gangrene and necrosis  Orthopedics consulted.  Revised Cardiac Risk Index for Pre-Operative Risk score of:   2 points Class III Risk  The patient has a 10.1 % 30-day risk of death, MI, or cardiac arrest         12/22: Patient underwent: \"Right foot irrigation and excisional debridement down to bone, Right foot toe amputation of the fourth and fifth toes at the metatarsal phalangeal joint, Right foot wound VAC placement\" by orthopedic surgery yesterday.  Orthopedic surgery does not believe that the limb is salvageable and they are planning on potentially doing a BKA but this will not happen until most likely early next week.  Orthopedic surgery to discuss " this with patient today.  Will continue with antibiotics for now.  Pending cultures.  Once we have any culture results we will consult ID.  For now we will continue with Zyvox and Zosyn.         VTE prophylaxis:   SCDs/TEDs      I have performed a physical exam and reviewed and updated ROS and Plan today (12/23/2023). In review of yesterday's note (12/22/2023), there are no changes except as documented above.

## 2023-12-23 NOTE — PROGRESS NOTES
Messaged provider positive blood cultures drawn on 12/21/23 growing gram positive rods.provider responded no new orders at this time pt. Already on antibiotics

## 2023-12-23 NOTE — PROGRESS NOTES
Telemetry Summary     Rhythm: SR  Rate: 86-99  Ectopy: rPVC  Measurements: .12/.08/.36    Normal Values   Rhythm: SR  HR Range:   Measurement: 0.12-0.20/0.06-0.10/0.30-0.52

## 2023-12-23 NOTE — CARE PLAN
The patient is Watcher - Medium risk of patient condition declining or worsening    Shift Goals  Clinical Goals: IV fluids, monitor labs, wound vac  Patient Goals: ambulation    Progress made toward(s) clinical / shift goals:  PT. RUNNING FLUIDS, MINIMAL OUTPUT FROM WOUND VAC OVERNIGHT  Problem: Pain - Standard  Goal: Alleviation of pain or a reduction in pain to the patient’s comfort goal  Outcome: Progressing     Problem: Knowledge Deficit - Standard  Goal: Patient and family/care givers will demonstrate understanding of plan of care, disease process/condition, diagnostic tests and medications  Outcome: Progressing     Problem: Hemodynamics  Goal: Patient's hemodynamics, fluid balance and neurologic status will be stable or improve  Outcome: Progressing     Problem: Fluid Volume  Goal: Fluid volume balance will be maintained  Outcome: Progressing     Problem: Urinary - Renal Perfusion  Goal: Ability to achieve and maintain adequate renal perfusion and functioning will improve  Outcome: Progressing     Problem: Respiratory  Goal: Patient will achieve/maintain optimum respiratory ventilation and gas exchange  Outcome: Progressing     Problem: Mechanical Ventilation  Goal: Safe management of artificial airway and ventilation  Outcome: Progressing  Goal: Successful weaning off mechanical ventilator, spontaneously maintains adequate gas exchange  Outcome: Progressing  Goal: Patient will be able to express needs and understand communication  Outcome: Progressing     Problem: Physical Regulation  Goal: Diagnostic test results will improve  Outcome: Progressing  Goal: Signs and symptoms of infection will decrease  Outcome: Progressing     Problem: Diabetes Management  Goal: Patient will achieve and maintain glucose in satisfactory range  Outcome: Progressing     Problem: Knowledge Deficit - Diabetes  Goal: Patient will demonstrate knowledge of insulin injection, symptoms, and treatment of hypoglycemia and diet prior to  discharge  Outcome: Progressing     Problem: Discharge Planning - Diabetes  Goal: Patient's continuum of care needs will be met  Outcome: Progressing     Problem: Skin Integrity - Diabetes  Goal: Patient's skin on legs and feet will remain intact while hospitalized  Outcome: Progressing     Problem: Infection - Diabetes  Goal: Patient will remain free from signs and symptoms of infection  Outcome: Progressing  Goal: Promotion wound healing, line and drain management  Outcome: Progressing     Problem: Fluid Balance or Risk for Fluid Volume Deficit  Goal: Patient will demonstrate adequate hydration and vital signs  Outcome: Progressing     Problem: Nutrition Deficit - Diabetes  Goal: Patient will demonstrate adequate hydration and vital signs  Outcome: Progressing     Problem: Diabetic Ulcer  Goal: Early identification of diabetic foot wound and initiation of appropriate interventions  Outcome: Progressing       Patient is not progressing towards the following goals:

## 2023-12-23 NOTE — CARE PLAN
The patient is Stable - Low risk of patient condition declining or worsening    Shift Goals  Clinical Goals: Monitor for s/s of infection  Patient Goals: Get up to chair    Progress made toward(s) clinical / shift goals:    Problem: Pain - Standard  Goal: Alleviation of pain or a reduction in pain to the patient’s comfort goal  Outcome: Progressing  Note: Patient denies pain during shift. Encouraged to report any discomfort or pain during shift, especially on foot wound.      Problem: Knowledge Deficit - Standard  Goal: Patient and family/care givers will demonstrate understanding of plan of care, disease process/condition, diagnostic tests and medications  Outcome: Progressing  Note: Reviewed plan of care with patient. Explained pharmacological and non-pharmacological interventions during shift. Patient educated on purpose for wound vac. Reviewed IV abx and other medications during shift.      Problem: Infection - Diabetes  Goal: Patient will remain free from signs and symptoms of infection  Outcome: Progressing  Note: Patient afebrile. Clear lung sounds. Encouraged to report any flu-like symptoms. Glucose being monitored with fingers sticks. Patient received insulin during shift to manage glucose and promote healing of foot wound.     Problem: Fall Risk  Goal: Patient will remain free from falls  Outcome: Progressing  Note: Patient remains free from falls during shift. Patient demonstrates proper use of call light for assistance. Bed alarms activated. Bed is locked and in lowest position.        Patient is not progressing towards the following goals:

## 2023-12-23 NOTE — PROGRESS NOTES
Telemetry Shift Summary    Rhythm SR  HR Range 78-84  Ectopy Rpac  Measurements 0.12/0.08/0.36      Normal Values  Rhythm SR  HR Range:   Measurements: 0.12-0.20/0.06-0.10/0.30-0.52

## 2023-12-24 LAB
ANION GAP SERPL CALC-SCNC: 8 MMOL/L (ref 7–16)
BACTERIA BLD CULT: ABNORMAL
BACTERIA BLD CULT: ABNORMAL
BUN SERPL-MCNC: 12 MG/DL (ref 8–22)
CALCIUM SERPL-MCNC: 7.1 MG/DL (ref 8.4–10.2)
CHLORIDE SERPL-SCNC: 99 MMOL/L (ref 96–112)
CO2 SERPL-SCNC: 20 MMOL/L (ref 20–33)
CREAT SERPL-MCNC: 1.24 MG/DL (ref 0.5–1.4)
ERYTHROCYTE [DISTWIDTH] IN BLOOD BY AUTOMATED COUNT: 38.5 FL (ref 35.9–50)
GFR SERPLBLD CREATININE-BSD FMLA CKD-EPI: 71 ML/MIN/1.73 M 2
GLUCOSE BLD STRIP.AUTO-MCNC: 180 MG/DL (ref 65–99)
GLUCOSE BLD STRIP.AUTO-MCNC: 182 MG/DL (ref 65–99)
GLUCOSE BLD STRIP.AUTO-MCNC: 186 MG/DL (ref 65–99)
GLUCOSE BLD STRIP.AUTO-MCNC: 229 MG/DL (ref 65–99)
GLUCOSE SERPL-MCNC: 232 MG/DL (ref 65–99)
HCT VFR BLD AUTO: 28.7 % (ref 42–52)
HGB BLD-MCNC: 9.3 G/DL (ref 14–18)
MCH RBC QN AUTO: 28 PG (ref 27–33)
MCHC RBC AUTO-ENTMCNC: 32.4 G/DL (ref 32.3–36.5)
MCV RBC AUTO: 86.4 FL (ref 81.4–97.8)
PLATELET # BLD AUTO: 274 K/UL (ref 164–446)
PMV BLD AUTO: 9.7 FL (ref 9–12.9)
POTASSIUM SERPL-SCNC: 4.2 MMOL/L (ref 3.6–5.5)
RBC # BLD AUTO: 3.32 M/UL (ref 4.7–6.1)
SIGNIFICANT IND 70042: ABNORMAL
SITE SITE: ABNORMAL
SODIUM SERPL-SCNC: 127 MMOL/L (ref 135–145)
SOURCE SOURCE: ABNORMAL
WBC # BLD AUTO: 9.4 K/UL (ref 4.8–10.8)

## 2023-12-24 PROCEDURE — 97162 PT EVAL MOD COMPLEX 30 MIN: CPT

## 2023-12-24 PROCEDURE — 700111 HCHG RX REV CODE 636 W/ 250 OVERRIDE (IP): Mod: JZ | Performed by: INTERNAL MEDICINE

## 2023-12-24 PROCEDURE — 700111 HCHG RX REV CODE 636 W/ 250 OVERRIDE (IP): Mod: JZ | Performed by: HOSPITALIST

## 2023-12-24 PROCEDURE — 36415 COLL VENOUS BLD VENIPUNCTURE: CPT

## 2023-12-24 PROCEDURE — 94760 N-INVAS EAR/PLS OXIMETRY 1: CPT

## 2023-12-24 PROCEDURE — 700105 HCHG RX REV CODE 258: Performed by: INTERNAL MEDICINE

## 2023-12-24 PROCEDURE — 85027 COMPLETE CBC AUTOMATED: CPT

## 2023-12-24 PROCEDURE — 82962 GLUCOSE BLOOD TEST: CPT | Mod: 91

## 2023-12-24 PROCEDURE — 99255 IP/OBS CONSLTJ NEW/EST HI 80: CPT | Performed by: INTERNAL MEDICINE

## 2023-12-24 PROCEDURE — 770020 HCHG ROOM/CARE - TELE (206)

## 2023-12-24 PROCEDURE — 700102 HCHG RX REV CODE 250 W/ 637 OVERRIDE(OP): Performed by: INTERNAL MEDICINE

## 2023-12-24 PROCEDURE — A9270 NON-COVERED ITEM OR SERVICE: HCPCS | Performed by: INTERNAL MEDICINE

## 2023-12-24 PROCEDURE — 80048 BASIC METABOLIC PNL TOTAL CA: CPT

## 2023-12-24 PROCEDURE — 99232 SBSQ HOSP IP/OBS MODERATE 35: CPT | Performed by: INTERNAL MEDICINE

## 2023-12-24 PROCEDURE — 700105 HCHG RX REV CODE 258: Performed by: HOSPITALIST

## 2023-12-24 RX ADMIN — INSULIN HUMAN 1 UNITS: 100 INJECTION, SOLUTION PARENTERAL at 22:14

## 2023-12-24 RX ADMIN — INSULIN HUMAN 2 UNITS: 100 INJECTION, SOLUTION PARENTERAL at 17:59

## 2023-12-24 RX ADMIN — PIPERACILLIN AND TAZOBACTAM 4.5 G: 4; .5 INJECTION, POWDER, FOR SOLUTION INTRAVENOUS at 10:44

## 2023-12-24 RX ADMIN — PIPERACILLIN AND TAZOBACTAM 4.5 G: 4; .5 INJECTION, POWDER, FOR SOLUTION INTRAVENOUS at 17:54

## 2023-12-24 RX ADMIN — PIPERACILLIN AND TAZOBACTAM 4.5 G: 4; .5 INJECTION, POWDER, FOR SOLUTION INTRAVENOUS at 02:06

## 2023-12-24 RX ADMIN — LINEZOLID 600 MG: 600 TABLET, FILM COATED ORAL at 17:54

## 2023-12-24 RX ADMIN — SODIUM CHLORIDE, POTASSIUM CHLORIDE, SODIUM LACTATE AND CALCIUM CHLORIDE: 600; 310; 30; 20 INJECTION, SOLUTION INTRAVENOUS at 12:56

## 2023-12-24 RX ADMIN — SODIUM CHLORIDE, POTASSIUM CHLORIDE, SODIUM LACTATE AND CALCIUM CHLORIDE: 600; 310; 30; 20 INJECTION, SOLUTION INTRAVENOUS at 22:24

## 2023-12-24 RX ADMIN — LINEZOLID 600 MG: 600 TABLET, FILM COATED ORAL at 06:23

## 2023-12-24 RX ADMIN — INSULIN HUMAN 1 UNITS: 100 INJECTION, SOLUTION PARENTERAL at 06:26

## 2023-12-24 RX ADMIN — INSULIN HUMAN 1 UNITS: 100 INJECTION, SOLUTION PARENTERAL at 12:49

## 2023-12-24 ASSESSMENT — ENCOUNTER SYMPTOMS
FEVER: 0
ABDOMINAL PAIN: 0
NERVOUS/ANXIOUS: 0
PHOTOPHOBIA: 0
HEARTBURN: 0
CHILLS: 0
DIZZINESS: 0
DEPRESSION: 0
INSOMNIA: 0
HEADACHES: 0
SENSORY CHANGE: 0
SPEECH CHANGE: 0
SHORTNESS OF BREATH: 0
DIARRHEA: 0
VOMITING: 0
MYALGIAS: 0
CLAUDICATION: 0
COUGH: 0
WEAKNESS: 0
CONSTIPATION: 0
BLURRED VISION: 0

## 2023-12-24 ASSESSMENT — FIBROSIS 4 INDEX: FIB4 SCORE: 0.7

## 2023-12-24 ASSESSMENT — COGNITIVE AND FUNCTIONAL STATUS - GENERAL
CLIMB 3 TO 5 STEPS WITH RAILING: A LOT
MOBILITY SCORE: 18
MOVING FROM LYING ON BACK TO SITTING ON SIDE OF FLAT BED: A LITTLE
STANDING UP FROM CHAIR USING ARMS: A LITTLE
TURNING FROM BACK TO SIDE WHILE IN FLAT BAD: A LITTLE
SUGGESTED CMS G CODE MODIFIER MOBILITY: CK
WALKING IN HOSPITAL ROOM: A LITTLE

## 2023-12-24 ASSESSMENT — PAIN DESCRIPTION - PAIN TYPE
TYPE: ACUTE PAIN
TYPE: ACUTE PAIN

## 2023-12-24 ASSESSMENT — GAIT ASSESSMENTS: GAIT LEVEL OF ASSIST: UNABLE TO PARTICIPATE

## 2023-12-24 NOTE — PROGRESS NOTES
Hospital Medicine Daily Progress Note    Date of Service  12/24/2023    Chief Complaint  Nestor Mckenzie Jr. is a 48 y.o. male admitted 12/21/2023 with black lesion on his right foot    Hospital Course  Patient is a 48-year-old male with diabetes who presented with black discoloration of the right toe with pain, redness and swelling.  Been doing well until 4 days prior when he thought he felt a spider bite on his right foot and since that time has had worsening pain redness with swelling.  He presented to the emergency room and had acute kidney injury, hyponatremia and elevated lactic acid consistent with sepsis.  Orthopedic surgery was consulted given the extensive nature of the necrosis.  He had large amount of debridement on 12/22 and tolerated the procedure well.  At this time patient is hoping to preserve as much of his foot as possible and is not interested in BKA unless it comes to know other options.    Interval Problem Update  12/23 patient is postop day 1 from extensive debridement of the right foot with wound VAC placement.  Patient states that he wants to save as much of his foot as possible and is not interested in BKA unless he tries all other options and is not prove successful.  12/24: Patient is slightly more open to amputation after discussion with infectious disease.  Given the large amount of tissue exposed with the dissection of the gangrene likely the only viable option to treat infection will be a below the knee amputation.  Patient still making ultimate decision as he not has not committed one way or the other.    I have discussed this patient's plan of care and discharge plan at IDT rounds today with Case Management, Nursing, Nursing leadership, and other members of the IDT team.    Consultants/Specialty  orthopedics    Code Status  Full Code    Disposition  The patient is not medically cleared for discharge to home or a post-acute facility.  Anticipate discharge to: home with close  outpatient follow-up    I have placed the appropriate orders for post-discharge needs.    Review of Systems  Review of Systems   Constitutional:  Negative for chills and fever.   HENT:  Negative for congestion.    Eyes:  Negative for blurred vision and photophobia.   Respiratory:  Negative for cough and shortness of breath.    Cardiovascular:  Negative for chest pain, claudication and leg swelling.   Gastrointestinal:  Negative for abdominal pain, constipation, diarrhea, heartburn and vomiting.   Genitourinary:  Negative for dysuria and hematuria.   Musculoskeletal:  Negative for joint pain and myalgias.   Skin:  Negative for itching and rash.   Neurological:  Negative for dizziness, sensory change, speech change, weakness and headaches.   Psychiatric/Behavioral:  Negative for depression. The patient is not nervous/anxious and does not have insomnia.         Physical Exam  Temp:  [36.5 °C (97.7 °F)-37.1 °C (98.8 °F)] 36.7 °C (98.1 °F)  Pulse:  [80-94] 83  Resp:  [16-19] 18  BP: (131-158)/(75-92) 150/92  SpO2:  [91 %-97 %] 92 %    Physical Exam  Vitals and nursing note reviewed.   Constitutional:       General: He is not in acute distress.     Appearance: Normal appearance.   HENT:      Head: Normocephalic and atraumatic.   Eyes:      General: No scleral icterus.     Extraocular Movements: Extraocular movements intact.   Cardiovascular:      Rate and Rhythm: Normal rate and regular rhythm.      Pulses: Normal pulses.      Heart sounds: Normal heart sounds. No murmur heard.  Pulmonary:      Effort: Pulmonary effort is normal. No respiratory distress.      Breath sounds: Normal breath sounds. No wheezing, rhonchi or rales.   Abdominal:      General: Abdomen is flat. Bowel sounds are normal. There is no distension.      Palpations: Abdomen is soft.      Tenderness: There is no rebound.   Musculoskeletal:         General: No swelling or tenderness.      Cervical back: Normal range of motion and neck supple.    Lymphadenopathy:      Cervical: No cervical adenopathy.   Skin:     Coloration: Skin is not jaundiced.      Findings: No erythema.      Comments: Right foot dressed and wound VAC in place, patient able to move toes, sensation intact  Wound pictures reviewed   Neurological:      General: No focal deficit present.      Mental Status: He is alert and oriented to person, place, and time. Mental status is at baseline.      Cranial Nerves: No cranial nerve deficit.   Psychiatric:         Mood and Affect: Mood normal.         Behavior: Behavior normal.         Fluids    Intake/Output Summary (Last 24 hours) at 12/24/2023 1352  Last data filed at 12/24/2023 1118  Gross per 24 hour   Intake 330 ml   Output 2150 ml   Net -1820 ml         Laboratory  Recent Labs     12/22/23  0055 12/22/23  0622 12/22/23  1711 12/23/23  0126 12/24/23  0109   WBC 14.3*  --   --  11.6* 9.4   RBC 3.98*  --   --  3.34* 3.32*   HEMOGLOBIN 11.5*   < > 10.3* 9.7* 9.3*   HEMATOCRIT 35.5*   < > 31.3* 29.2* 28.7*   MCV 89.2  --   --  87.4 86.4   MCH 28.9  --   --  29.0 28.0   MCHC 32.4  --   --  33.2 32.4   RDW 38.8  --   --  39.0 38.5   PLATELETCT 230  --   --  244 274   MPV 9.8  --   --  10.0 9.7    < > = values in this interval not displayed.       Recent Labs     12/22/23  0055 12/23/23  0126 12/24/23  0109   SODIUM 130* 130* 127*   POTASSIUM 3.8 3.9 4.2   CHLORIDE 98 97 99   CO2 20 24 20   GLUCOSE 125* 153* 232*   BUN 22 15 12   CREATININE 1.35 1.39 1.24   CALCIUM 7.8* 7.2* 7.1*                     Imaging  DX-FOOT-COMPLETE 3+ RIGHT   Final Result      1.  Acute fracture of the base of the fifth metatarsal.      2.  Extensive soft tissue gas involving the mid and forefoot bilaterally likely representing tissue necrosis/gangrene.      3.  Demineralization of the fifth metatarsal possibly representing presence of osteomyelitis.      DX-CHEST-PORTABLE (1 VIEW)   Final Result      No evidence of acute cardiopulmonary process.        "    Assessment/Plan  * Sepsis (HCC)- (present on admission)  Assessment & Plan  Sepsis criteria has resolved      Hypomagnesemia  Assessment & Plan  Replace as needed  monitor    Leukocytosis  Assessment & Plan  In the setting of infection  Monitor  Continue antibiotics.    Abnormal EKG- (present on admission)  Assessment & Plan  Patient states he feels well  No need for echo    Cellulitis of right lower extremity- (present on admission)  Assessment & Plan  I will start Zosyn and Zyvox, follow on cultures and sensitivities  There is extensive soft tissue gas concerning for gangrene and necrosis  Orthopedics consulted     12/22: Patient underwent: \"Right foot irrigation and excisional debridement down to bone, Right foot toe amputation of the fourth and fifth toes at the metatarsal phalangeal joint, Right foot wound VAC placement\" by orthopedic surgery yesterday.  Orthopedic surgery does not believe that the limb is salvageable and they are planning on potentially doing a BKA but this will not happen until most likely early next week.  Orthopedic surgery to discuss this with patient today.  Will continue with antibiotics for now.  Pending cultures.  Once we have any culture results we will consult ID.  For now we will continue with Zyvox and Zosyn.    12/24: Preoperative wound positive for Pasteurella and group B strep, per ID continue with Zosyn, appreciate consultation    Dehydration- (present on admission)  Assessment & Plan  Likely secondary to reduced oral intake, and increase in insensible loss due to infection.  Encourage oral intake as tolerated, antiemetics as needed.  Intravenous hydration until adequate oral intake is achieved.     Anemia- (present on admission)  Assessment & Plan  There could be an acute blood loss component due to recent surgery  Monitor for any signs of bleeding      Acute hematogenous osteomyelitis of right foot (HCC)- (present on admission)  Assessment & Plan  Continue Zosyn, follow " "on cultures and sensitivities    12/22: Patient underwent: \"Right foot irrigation and excisional debridement down to bone, Right foot toe amputation of the fourth and fifth toes at the metatarsal phalangeal joint, Right foot wound VAC placement\" by orthopedic surgery yesterday.  Orthopedic surgery does not believe that the limb is salvageable and they are planning on potentially doing a BKA but this will not happen until most likely early next week.  Orthopedic surgery to discuss this with patient today.  Will continue with antibiotics for now.  Pending cultures.  Once we have any culture results we will consult ID.  For now we will continue with Zyvox and Zosyn.    12/23: Patient states he wants to save his much as he can of his foot and is not interested in a BKA at this point.,  Will await cultures and then discussed with infectious disease when appropriate    12/24: Patient doing about the same today.  Appreciate infectious disease consultation, discussed with Dr. Rivera.  Patient's foot is likely not salvageable and will likely need to have below the knee amputation.  This will give him the best opportunity for a normal gait once prosthesis is accommodated.    Fracture of the fifth metatarsal- (present on admission)  Assessment & Plan  Orthopedics consulted     Type 2 diabetes mellitus with diabetic peripheral angiopathy and gangrene, without long-term current use of insulin (HCC)- (present on admission)  Assessment & Plan  With hyperglycemia  I will check a glycated hemoglobin    I will start short acting insulin for now  I will order Accu-Checks, hypoglycemia protocol  Adjust according to blood sugars trend.     Sugars under control    SRIRAM (acute kidney injury) (HCC)- (present on admission)  Assessment & Plan  Mostly due to sepsis  I will start intravenous fluids  Avoid / minimize nephrotoxins as much as possible.  Monitor inputs and outputs     12/22: Continue IVF for now. Cr seems to be " "improving.    Hyponatremia- (present on admission)  Assessment & Plan  Seems to be improving  Continue with IVF for now    Gangrene associated with diabetes mellitus (HCC)- (present on admission)  Assessment & Plan        12/22: Patient underwent: \"Right foot irrigation and excisional debridement down to bone, Right foot toe amputation of the fourth and fifth toes at the metatarsal phalangeal joint, Right foot wound VAC placement\" by orthopedic surgery yesterday.  Orthopedic surgery does not believe that the limb is salvageable and they are planning on potentially doing a BKA but this will not happen until most likely early next week.  Orthopedic surgery to discuss this with patient today.  Will continue with antibiotics for now.  Pending cultures.  Once we have any culture results we will consult ID.  For now we will continue with Zyvox and Zosyn.    12/24: Patient now more amenable to the possibility of amputation as this will likely be the only viable option for recovery based on length of antibiotic and as also the significant debridement that was necessary to dissect the gangrene.  Wound VAC in place and under this tendon and bone exposed.  Discuss further with orthopedic surgery         VTE prophylaxis:   SCDs/TEDs      I have performed a physical exam and reviewed and updated ROS and Plan today (12/24/2023). In review of yesterday's note (12/23/2023), there are no changes except as documented above.        "

## 2023-12-24 NOTE — THERAPY
Physical Therapy   Initial Evaluation     Patient Name: Nestor Mckenzie Jr.  Age:  48 y.o., Sex:  male  Medical Record #: [id number]  Today's Date: 12/24/2023     Precautions  Precautions: (P) Fall Risk;Non Weight Bearing Right Lower Extremity;Other (See Comments) (VAC R foot)    Assessment    Patient is 48 y.o. male admitted 12/21/2023 with black lesion on his right foot. He presented to the emergency room and had acute kidney injury, hyponatremia and elevated lactic acid consistent with sepsis. Possible spider bite.  S/p R foot extensive debridement for necrotizing diabetic foot infection. VAC in place. Pt wishes to salvage foot. POC yet to be established.   Pt wishes to get up in chair. Pt is motivated and willing to participate. Review there ex for bed with good understanding. Good UE strength noted for partial stand pivot transfer with NWB RLE. 3 story apt may pose a problem for pt to return home.  Awaiting POC. See below flowsheet for eval details.      Plan    Physical Therapy Initial Treatment Plan   Treatment Plan : (P) Neuro Re-Education / Balance, Self Care / Home Evaluation, Stair Training, Therapeutic Activities, Therapeutic Exercise, Gait Training, Bed Mobility  Treatment Frequency: (P) 4 Times per Week  Duration: (P) Until Therapy Goals Met    DC Equipment Recommendations: (P) Unable to determine at this time  Discharge Recommendations: (P) Recommend home health for continued physical therapy services (vs post acute PT depending on Sx POC)            12/24/23 1306   Charge Group   PT Evaluation PT Evaluation Mod   Total Time Spent   PT Total Time Yes   PT Evaluation Time Spent (Mins) 30    Services   Is patient using  services for this encounter? No   Initial Contact Note    Initial Contact Note Order Received and Verified, Physical Therapy Evaluation in Progress with Full Report to Follow.   Precautions   Precautions Fall Risk;Non Weight Bearing Right Lower Extremity;Other (See  Comments)  (VAC R foot)   Vitals   O2 Delivery Device None - Room Air   Pain 0 - 10 Group   Therapist Pain Assessment 0;Nurse Notified;Post Activity Pain Same as Prior to Activity   Prior Living Situation   Prior Services Home-Independent   Housing / Facility 3 Story Apartment / Condo   Steps Into Home 24   Rail Right Rail (Steps into Home)   Equipment Owned None   Lives with - Patient's Self Care Capacity Alone and Able to Care For Self   Prior Level of Functional Mobility   Bed Mobility Independent   Transfer Status Independent   Ambulation Independent   Assistive Devices Used None   Stairs Independent   Comments pt currently not working, has 7 childern and ex spouse in [place].   History of Falls   History of Falls No   Cognition    Cognition / Consciousness WDL   Level of Consciousness Alert   Comments quiet and reserved   Passive ROM Lower Body   Passive ROM Lower Body X   Comments R foot and toes NT   Active ROM Lower Body    Active ROM Lower Body  X   Strength Lower Body   Lower Body Strength  X   Comments R foot NT otherwise WFL   Sensation Lower Body   Comments neuropathy B feet   Lower Body Muscle Tone   Lower Body Muscle Tone  WDL   Neurological Concerns   Neurological Concerns No   Coordination Lower Body    Coordination Lower Body  WDL   Vision   Vision Comments denies   Other Treatments   Other Treatments Provided provide encouragement and comfort   Balance Assessment   Sitting Balance (Static) Fair +   Sitting Balance (Dynamic) Fair   Standing Balance (Static) Fair   Standing Balance (Dynamic) Fair -   Weight Shift Sitting Good   Weight Shift Standing Absent   Bed Mobility    Supine to Sit Supervised   Sit to Supine   (left up in recliner)   Scooting Supervised   Rolling Modified Independent   Gait Analysis   Gait Level Of Assist Unable to Participate   Functional Mobility   Sit to Stand Supervised   Bed, Chair, Wheelchair Transfer Supervised   Comments partial stand pivot to chair   How much  difficulty does the patient currently have...   Turning over in bed (including adjusting bedclothes, sheets and blankets)? 3   Sitting down on and standing up from a chair with arms (e.g., wheelchair, bedside commode, etc.) 3   Moving from lying on back to sitting on the side of the bed? 4   How much help from another person does the patient currently need...   Moving to and from a bed to a chair (including a wheelchair)? 3   Need to walk in a hospital room? 3   Climbing 3-5 steps with a railing? 2   6 clicks Mobility Score 18   Activity Tolerance   Sitting in Chair 1 hour   Sitting Edge of Bed 10 min   Standing 10 sec   Short Term Goals    Short Term Goal # 1 in 6 V pt will perform transfers to various surfaces with LRAD and mod indep   Short Term Goal # 2 in 6V pt will Amb x 100 feet with mod indep using FWW or crutches.   Short Term Goal # 3 in 6 V pt will clilmb up and down 24 stairs inorder to get into apt with B rail and mod indep   Education Group   Education Provided Role of Physical Therapist;Exercises - Supine   Role of Physical Therapist Patient Response Patient;Acceptance;Explanation;Verbal Demonstration   Exercises - Supine Patient Response Patient;Acceptance;Explanation;Verbal Demonstration;Action Demonstration   Physical Therapy Initial Treatment Plan    Treatment Plan  Neuro Re-Education / Balance;Self Care / Home Evaluation;Stair Training;Therapeutic Activities;Therapeutic Exercise;Gait Training;Bed Mobility   Treatment Frequency 4 Times per Week   Duration Until Therapy Goals Met   Problem List    Problems Impaired Bed Mobility;Impaired Transfers;Impaired Ambulation;Decreased Activity Tolerance   Anticipated Discharge Equipment and Recommendations   DC Equipment Recommendations Unable to determine at this time   Discharge Recommendations Recommend home health for continued physical therapy services  (vs post acute PT depending on Sx POC)   Interdisciplinary Plan of Care Collaboration   IDT  Collaboration with  Nursing;Physician   Patient Position at End of Therapy Seated;Phone within Reach;Tray Table within Reach;Call Light within Reach   Collaboration Comments re eval   Session Information   Date / Session Number  12/24/23 - 1 ( 1/4, 12/30)

## 2023-12-24 NOTE — CARE PLAN
The patient is Stable - Low risk of patient condition declining or worsening    Shift Goals  Clinical Goals: Monitor vitals, labs, s/s of infection, and maintain wound care  Patient Goals: Get up to chair    Progress made toward(s) clinical / shift goals:    Problem: Pain - Standard  Goal: Alleviation of pain or a reduction in pain to the patient’s comfort goal  Outcome: Progressing  Note: Patient denies pain during shift. Educated on pharmacological and non-pharmacological options. Encouraged patient to report any discomfort and pain.      Problem: Knowledge Deficit - Standard  Goal: Patient and family/care givers will demonstrate understanding of plan of care, disease process/condition, diagnostic tests and medications  Outcome: Progressing  Note: Reviewed plan of care with patient. Discussed medications, IV fluids, IV abx, and finger sticks during shift. Explained pharmacological and non-pharmacological interventions during shift.      Problem: Fall Risk  Goal: Patient will remain free from falls  Outcome: Progressing  Note: Patient demonstrates proper use of call light for assistance. Call light and other items within reach. Educated on mobility limitations at this time due to wound on right foot. Bed is locked and in lowest position. Bed alarm activated.        Patient is not progressing towards the following goals:

## 2023-12-24 NOTE — CARE PLAN
Problem: Pain - Standard  Goal: Alleviation of pain or a reduction in pain to the patient’s comfort goal  Outcome: Progressing     Problem: Knowledge Deficit - Standard  Goal: Patient and family/care givers will demonstrate understanding of plan of care, disease process/condition, diagnostic tests and medications  Outcome: Progressing     Problem: Hemodynamics  Goal: Patient's hemodynamics, fluid balance and neurologic status will be stable or improve  Outcome: Progressing     Problem: Fluid Volume  Goal: Fluid volume balance will be maintained  Outcome: Progressing     Problem: Urinary - Renal Perfusion  Goal: Ability to achieve and maintain adequate renal perfusion and functioning will improve  Outcome: Progressing     Problem: Respiratory  Goal: Patient will achieve/maintain optimum respiratory ventilation and gas exchange  Outcome: Progressing     Problem: Mechanical Ventilation  Goal: Safe management of artificial airway and ventilation  Outcome: Progressing  Goal: Successful weaning off mechanical ventilator, spontaneously maintains adequate gas exchange  Outcome: Progressing  Goal: Patient will be able to express needs and understand communication  Outcome: Progressing     Problem: Physical Regulation  Goal: Diagnostic test results will improve  Outcome: Progressing  Goal: Signs and symptoms of infection will decrease  Outcome: Progressing     Problem: Diabetes Management  Goal: Patient will achieve and maintain glucose in satisfactory range  Outcome: Progressing     Problem: Knowledge Deficit - Diabetes  Goal: Patient will demonstrate knowledge of insulin injection, symptoms, and treatment of hypoglycemia and diet prior to discharge  Outcome: Progressing     Problem: Discharge Planning - Diabetes  Goal: Patient's continuum of care needs will be met  Outcome: Progressing     Problem: Skin Integrity - Diabetes  Goal: Patient's skin on legs and feet will remain intact while hospitalized  Outcome:  Progressing     Problem: Infection - Diabetes  Goal: Patient will remain free from signs and symptoms of infection  Outcome: Progressing  Goal: Promotion wound healing, line and drain management  Outcome: Progressing     Problem: Fluid Balance or Risk for Fluid Volume Deficit  Goal: Patient will demonstrate adequate hydration and vital signs  Outcome: Progressing     Problem: Nutrition Deficit - Diabetes  Goal: Patient will demonstrate adequate hydration and vital signs  Outcome: Progressing     Problem: Diabetic Ulcer  Goal: Early identification of diabetic foot wound and initiation of appropriate interventions  Outcome: Progressing     Problem: Fall Risk  Goal: Patient will remain free from falls  Outcome: Progressing   The patient is Watcher - Medium risk of patient condition declining or worsening    Shift Goals  Clinical Goals: IV fluids, IV abx  Patient Goals: up to chair    Progress made toward(s) clinical / shift goals:  pt. Reports not pain. Good pulses using dopple toes cap refill less than three seconds and warm to touch VSS    Patient is not progressing towards the following goals:

## 2023-12-24 NOTE — WOUND TEAM
"[facility]  Inpatient Services  Initial Wound and Skin Care Evaluation    Admission Date: 12/21/2023     Last order of IP CONSULT TO WOUND CARE was found on 12/22/2023 from Hospital Encounter on 12/21/2023     HPI, PMH, SH: Reviewed    Past Surgical History:   Procedure Laterality Date    TOE AMPUTATION Right 12/21/2023    Procedure: AMPUTATION, TOE;  Surgeon: Marco Antonio Kang M.D.;  Location: SURGERY [facility];  Service: Orthopedics    IRRIGATION & DEBRIDEMENT GENERAL Right 12/21/2023    Procedure: IRRIGATION AND DEBRIDEMENT, WOUND, RIGHT FOOT;  Surgeon: Marco Antonio Kang M.D.;  Location: SURGERY [facility];  Service: Orthopedics     Social History     Tobacco Use    Smoking status: Never    Smokeless tobacco: Never   Substance Use Topics    Alcohol use: Not Currently     Chief Complaint   Patient presents with    Wound Infection     Pt states he was in shower and felt something bite him. Pt states he then noticed a brown spider wash down the drain. Pt started feeling sick and foot was swollen and \"then all of a sudden the swelling disappeared and foot shriveled up\". Foot appears necrotic infection     Diagnosis: Sepsis (HCC) [A41.9]    Unit where seen by Wound Team: 3310/02     WOUND CONSULT RELATED TO:  R VAC change    WOUND TEAM PLAN OF CARE - Frequency of Follow-up:   Nursing to follow dressing orders written for wound care. Contact wound team if area fails to progress, deteriorates or with any questions/concerns if something comes up before next scheduled follow up (See below as to whether wound is following and frequency of wound follow up)   NPWT change 3 times weekly -      WOUND HISTORY:   Pt is a 49 yo male who presented to the ED for foot pain.  Pt states he was bit by a brown recluse in the shower around 12/17/23. Pt admitted for R foot necrosis,osteomyelitis, sepsis    DM for 5 years controlled with metformin, checks BG 2x/day and gets readings b/w 120-130    Have not yet ordered foot " wear, pt likely to have more surgery    12/21/23 I&D R foot Dr Kang    Pt is NWBing R foot       WOUND ASSESSMENT/LDA  Negative Pressure Wound Therapy 12/21/23 Surgical Foot Anterior Right (Active)   Placement Date/Time: 12/21/23 1920   Present on Original Admission: No  Inserted by: Dr. Kang  Wound Type: Surgical  Location: Foot  Wound Orientation: Anterior  Laterality: Right      Assessments 12/23/2023  5:00 PM   NPWT Pump Mode / Pressure Setting 125 mmHg;Continuous   Dressing Type Black Foam (Regular)       Wound 12/21/23 Incision Foot Right WOUND VAC, MASTISOL, IOBAN, 4X4,  ACE WRAP (Active)   Date First Assessed/Time First Assessed: 12/21/23 1956   Primary Wound Type: Incision  Location: Foot  Laterality: Right  Wound Description (Comments): WOUND VAC, MASTISOL, IOBAN, 4X4,  ACE WRAP      Assessments 12/23/2023  5:00 PM   Wound Image        Drainage Amount Small   Drainage Description Serosanguineous   Dressing Changed Changed   Dressing Options Wound Vac;Adaptic;Hydrofiber Silver   Dressing Change/Treatment Frequency Tuesday, Thursday, Saturday, and As Needed   NEXT Dressing Change/Treatment Date 12/26/23   NEXT Weekly Photo (Inpatient Only) 12/30/23   Wound Team Following Other (comment)   Wound Length (cm) 11 cm   Wound Width (cm) 8.5 cm   Wound Surface Area (cm^2) 93.5 cm^2   Wound Bed Granulation (%) 60 %   Wound Bed Slough (%) 10 %   Wound Odor Foul   Pulses DP;PT;2+;Right;Doppler   WOUND NURSE ONLY - Time Spent with Patient (mins) 60    Metatarsl 3,4,5 exposed, tendon breaking down  Satelite wound lateral mid foot that tunnels to main wound that has jc callus      Vascular:    REHANA:   No results found.    Lab Values:    Lab Results   Component Value Date/Time    WBC 11.6 (H) 12/23/2023 01:26 AM    RBC 3.34 (L) 12/23/2023 01:26 AM    HEMOGLOBIN 9.7 (L) 12/23/2023 01:26 AM    HEMATOCRIT 29.2 (L) 12/23/2023 01:26 AM    SEDRATEWES 80 (H) 12/21/2023 11:50 AM    HBA1C 9.4 (H) 12/21/2023 11:50 AM          Culture Results show:  Recent Results (from the past 720 hour(s))   CULTURE WOUND W/ GRAM STAIN    Collection Time: 12/21/23 12:28 PM    Specimen: Right Foot; Wound   Result Value Ref Range    Significant Indicator POS (POS)     Source WND     Site RIGHT FOOT     Culture Result Moderate growth usual skin alec. (A)     Gram Stain Result       Rare WBCs.  Many Gram positive cocci.  Few Gram negative rods.      Culture Result (A)      Pasteurella species  Light growth  Pasteurella dagmatis      Culture Result Streptococcus agalactiae (Group B)  Rare growth   (A)        Pain Level/Medicated:  PO pain medications administered by bedside RN 60 prior       INTERVENTIONS BY WOUND TEAM:  Performed standard wound care which includes appropriate positioning, dressing removal and non-selective debridement. Pictures and measurements obtained weekly if/when required.    Wound:  R foot  Preparation for Dressing removal: Removed without difficulty  Cleansed/Non-selectively Debrided with:  Wound cleanser and Gauze  Lauren wound: Cleansed with Wound cleanser and Gauze, Prepped with Drape  Primary Dressing:  adaptic to tendons, black reg foam, bridge to lateral midfoot wound,aquacel ag over lateral 5th MTH and woven between toes, sealed with drape, TRAC pad  Secondary (Outer) Dressing: mepilex sacral and heel, ACE    Advanced Wound Care Discharge Planning  Number of Clinicians necessary to complete wound care: 1  Is patient requiring IV pain medications for dressing changes:  No   Length of time for dressing change 60 min. (This does not include chart review, pre-medication time, set up, clean up or time spent charting.)    Interdisciplinary consultation: Patient, Bedside RN (), .    EVALUATION / RATIONALE FOR TREATMENT:     Date:  12/23/23  Wound Status:  Initial evaluation    R foot wound with bone and tendon exposure.  Foul smell infection vs tissue necrosis. There is some healthy appearing granualr tissue within the wound bed.     Answered pt's questions regarding prosthesis.  Protecting tendon with adaptic, will switch to veraflo next visit if appropriate         Goals: Increase in granular tissue, decrease in odor    NURSING PLAN OF CARE ORDERS:  Dressing changes: See Dressing Care orders    NUTRITION RECOMMENDATIONS   Wound Team Recommendations:  Dietary consult  Diabetes Education consult    DIET ORDERS (From admission to next 24h)       Start     Ordered    12/21/23 2300  Diet Order Diet: Consistent CHO (Diabetic)  ALL MEALS        Question:  Diet:  Answer:  Consistent CHO (Diabetic)    12/21/23 1785                    PREVENTATIVE INTERVENTIONS:    Q shift Bebo - performed per nursing policy  Q shift pressure point assessments - performed per nursing policy    Surface/Positioning  Waffle overlay  - Ordered    Offloading/Redistribution  Heel offloading dressing (Silicone dressing) - Applied this Visit    Anticipated discharge plans:  TBD        Vac Discharge Needs:  Vac Discharge plan is purely a recommendation from wound team and not a requirement for discharge unless otherwise stated by physician.  Veraflo Vac while inpatient, will need to remain on Veraflo Vac upon discharge

## 2023-12-24 NOTE — CONSULTS
"INFECTIOUS DISEASES INPATIENT CONSULT NOTE     Date of Service:12/24/2023    Consult Requested By: Lauren Cutler D.O.    Reason for Consultation: Gangrene right foot    History of Present Illness:   Nestor Mckenzie Jr. is a 48 y.o. diabetic male admitted 12/21/2023 for above  Per record review \"Patient reports that he has been feeling well until 4 days ago where he felt a spider bite on his right foot.  Since then he has been having progressively worsening pain redness, swelling eventually became black.  He also has fevers and chills.\"   In ED Afebrile, mild leukocytosis 14.3+SRIRAM with creatinine of 1.67,elevated lactic acid of 2.4.  XRAY cw necrotizing infection and fracture 5th digit  Ortho consulted-s/p debridement. Op notes and pictures reviewed  On Zyvox and Scienionn  Infectious Diseases consulted for antibiotic recommendation and management      Review Of Systems:  All other systems are reviewed and are negative     PMH:   Past Medical History:   Diagnosis Date    Prediabetes          PSH:  Past Surgical History:   Procedure Laterality Date    TOE AMPUTATION Right 12/21/2023    Procedure: AMPUTATION, TOE;  Surgeon: Marco Antonio Kang M.D.;  Location: SURGERY [facility];  Service: Orthopedics    IRRIGATION & DEBRIDEMENT GENERAL Right 12/21/2023    Procedure: IRRIGATION AND DEBRIDEMENT, WOUND, RIGHT FOOT;  Surgeon: Marco Antonio Kang M.D.;  Location: SURGERY [facility];  Service: Orthopedics       FAMILY HX:  History reviewed. No pertinent family history.    SOCIAL HX:  Social History     Socioeconomic History    Marital status: Single     Spouse name: Not on file    Number of children: Not on file    Years of education: Not on file    Highest education level: Not on file   Occupational History    Not on file   Tobacco Use    Smoking status: Never    Smokeless tobacco: Never   Vaping Use    Vaping Use: Never used   Substance and Sexual Activity    Alcohol use: Not Currently    Drug use: Not Currently    Sexual " activity: Not on file   Other Topics Concern    Not on file   Social History Narrative    Not on file     Social Determinants of Health     Financial Resource Strain: Not on file   Food Insecurity: Not on file   Transportation Needs: Not on file   Physical Activity: Not on file   Stress: Not on file   Social Connections: Not on file   Intimate Partner Violence: Not on file   Housing Stability: Not on file     Social History     Tobacco Use   Smoking Status Never   Smokeless Tobacco Never     Social History     Substance and Sexual Activity   Alcohol Use Not Currently       Allergies/Intolerances:  No Known Allergies      Other Current Medications:    Current Facility-Administered Medications:     linezolid (Zyvox) tablet 600 mg, 600 mg, Oral, Q12HRS, Jimenez Mckee M.D., 600 mg at 12/24/23 0623    tetanus-dipth-acell pertussis (Adacel) inj 0.5 mL, 0.5 mL, Intramuscular, Once PRN, Silverio Michele D.O.    piperacillin-tazobactam (Zosyn) 4.5 g in  mL IVPB, 4.5 g, Intravenous, Q8HR, Alondra Beltran M.D., Last Rate: 25 mL/hr at 12/24/23 1044, 4.5 g at 12/24/23 1044    acetaminophen (Tylenol) tablet 650 mg, 650 mg, Oral, Q6HRS PRN, Alondra Beltran M.D.    senna-docusate (Pericolace Or Senokot S) 8.6-50 MG per tablet 2 Tablet, 2 Tablet, Oral, BID, 2 Tablet at 12/22/23 0550 **AND** polyethylene glycol/lytes (Miralax) Packet 1 Packet, 1 Packet, Oral, QDAY PRN **AND** magnesium hydroxide (Milk Of Magnesia) suspension 30 mL, 30 mL, Oral, QDAY PRN **AND** bisacodyl (Dulcolax) suppository 10 mg, 10 mg, Rectal, QDAY PRN, Alondra Beltran M.D.    LR (Bolus) infusion 500 mL, 500 mL, Intravenous, Once PRN, Alondra Beltran M.D.    lactated ringers infusion, , Intravenous, Continuous, Alondra Beltran M.D., Last Rate: 100 mL/hr at 12/23/23 1744, New Bag at 12/23/23 1744    LR (Bolus) infusion 2,160 mL, 30 mL/kg, Intravenous, Once PRN, Alondra Beltran M.D.    Notify provider if pain remains uncontrolled, ,  ", CONTINUOUS **AND** Use the Numeric Rating Scale (NRS), Ybarra-Baker Faces (WBF), or FLACC on regular floors and Critical-Care Pain Observation Tool (CPOT) on ICUs/Trauma to assess pain, , , CONTINUOUS **AND** Pulse Ox, , , CONTINUOUS **AND** Pharmacy Consult Request ...Pain Management Review 1 Each, 1 Each, Other, PHARMACY TO DOSE **AND** If patient difficult to arouse and/or has respiratory depression (respiratory rate of 10 or less), stop any opiates that are currently infusing and call a Rapid Response., , , CONTINUOUS, Alondra Beltran M.D.    oxyCODONE immediate-release (Roxicodone) tablet 2.5 mg, 2.5 mg, Oral, Q3HRS PRN, 2.5 mg at 23 0311 **OR** oxyCODONE immediate-release (Roxicodone) tablet 5 mg, 5 mg, Oral, Q3HRS PRN, 5 mg at 23 1547 **OR** HYDROmorphone (Dilaudid) injection 0.25 mg, 0.25 mg, Intravenous, Q3HRS PRN, Alondra Beltran M.D.    insulin regular (HumuLIN R,NovoLIN R) injection, 1-6 Units, Subcutaneous, 4X/DAY ACHS, 1 Units at 23 0626 **AND** POC blood glucose manual result, , , Q AC AND BEDTIME(S) **AND** NOTIFY MD and PharmD, , , Once **AND** Administer 20 grams of glucose (approximately 8 ounces of fruit juice) every 15 minutes PRN FSBG less than 70 mg/dL, , , PRN **AND** dextrose 50% (D50W) injection 25 g, 25 g, Intravenous, Q15 MIN PRN, Alondra Beltran M.D.      Most Recent Vital Signs:  BP (!) 150/92 Comment: Rn notified   Pulse 83   Temp 36.7 °C (98.1 °F) (Oral)   Resp 18   Ht 1.651 m (5' 5\")   Wt 69.4 kg (153 lb)   SpO2 92%   BMI 25.46 kg/m²   Temp  Av.7 °C (98 °F)  Min: 35.8 °C (96.5 °F)  Max: 37.6 °C (99.7 °F)    Physical Exam:  General: well-appearing, well nourished no acute distress  HEENT: NCAT, PERRLA, sclera anicteric  Neck: supple, no lymphadenopathy  Chest: CTAB, unlabored.  Cardiac: RRR  Abdomen: non-tender, non-distended  Extremities: No cyanosis, clubbing. Right foot dressed/VAC  Skin: no rashes   Neuro: Alert and oriented times 3, Speech fluent " CN intact SOTELO  Psych: Mood normal Affect normal    Pertinent Lab Results:  Recent Labs     12/22/23  0055 12/23/23  0126 12/24/23  0109   WBC 14.3* 11.6* 9.4      Recent Labs     12/22/23  0055 12/22/23  0622 12/22/23  1711 12/23/23  0126 12/24/23  0109   HEMOGLOBIN 11.5*   < > 10.3* 9.7* 9.3*   HEMATOCRIT 35.5*   < > 31.3* 29.2* 28.7*   MCV 89.2  --   --  87.4 86.4   MCH 28.9  --   --  29.0 28.0   PLATELETCT 230  --   --  244 274    < > = values in this interval not displayed.         Recent Labs     12/22/23  0055 12/23/23  0126 12/24/23  0109   SODIUM 130* 130* 127*   POTASSIUM 3.8 3.9 4.2   CHLORIDE 98 97 99   CO2 20 24 20   CREATININE 1.35 1.39 1.24        Recent Labs     12/22/23  0055 12/23/23  0126   ALBUMIN 1.7* 1.6*        Pertinent Micro:  Results       Procedure Component Value Units Date/Time    CULTURE TISSUE W/ GRM STAIN [[id number]]  (Abnormal) Collected: 12/21/23 1907    Order Status: Completed Specimen: Tissue Updated: 12/24/23 1131     Significant Indicator POS     Source TISS     Site right foot tissue     Culture Result -     Gram Stain Result Rare WBCs.  Many Gram positive cocci.  Few Gram negative rods.       Culture Result Actinomyces turicensis  Moderate growth        Streptococcus agalactiae (Group B)  Light growth        Staphylococcus aureus  Light growth  Methicillin sensitive by screening method        Staphylococcus epidermidis  Light growth      Narrative:      1 - Foot tissue - Right foot tissue for culture  Surgery Specimen    Fungal Culture [[id number]] Collected: 12/21/23 1907    Order Status: Completed Specimen: Tissue Updated: 12/24/23 1131     Significant Indicator NEG     Source TISS     Site right foot tissue     Culture Result Culture in progress.     Fungal Smear Results No fungal elements seen.    Narrative:      1 - Foot tissue - Right foot tissue for culture  Surgery Specimen    Anaerobic Culture [[id number]] Collected: 12/21/23 1907    Order Status: Completed Specimen:  "Tissue Updated: 12/24/23 1131     Significant Indicator NEG     Source TISS     Site right foot tissue     Culture Result Culture in progress.    Narrative:      1 - Foot tissue - Right foot tissue for culture  Surgery Specimen    Anaerobic Culture [[id number]] Collected: 12/21/23 1907    Order Status: Completed Specimen: Tissue Updated: 12/23/23 1735     Significant Indicator NEG     Source TISS     Site Right foot tissue     Culture Result Culture in progress.    Narrative:      2 - Foot tissue - Right foot tissue for culture  Surgery Specimen    CULTURE TISSUE W/ GRM STAIN [[id number]] Collected: 12/21/23 1907    Order Status: Completed Specimen: Tissue Updated: 12/23/23 1735     Significant Indicator NEG     Source TISS     Site Right foot tissue     Culture Result Culture in progress.     Gram Stain Result Rare WBCs.  Many mixed bacteria, no predominant organism seen.      Narrative:      2 - Foot tissue - Right foot tissue for culture  Surgery Specimen    Fungal Culture [[id number]] Collected: 12/21/23 1907    Order Status: Completed Specimen: Tissue Updated: 12/23/23 1735     Significant Indicator NEG     Source TISS     Site Right foot tissue     Culture Result Culture in progress.     Fungal Smear Results No fungal elements seen.    Narrative:      2 - Foot tissue - Right foot tissue for culture  Surgery Specimen    BLOOD CULTURE [[id number]]  (Abnormal) Collected: 12/21/23 1150    Order Status: Completed Specimen: Blood from Peripheral Updated: 12/23/23 1355     Significant Indicator POS     Source BLD     Site PERIPHERAL     Culture Result Growth detected by Bactec instrument. 12/23/2023  05:30      Gram-positive hussein  Unable to identify by Sepsityper, additional studies  pending.      Narrative:      CALL  [facility]  MED tel. [phone],  CALLED  MED tel. [phone] 12/23/2023, 05:33, RB PERF. RESULTS CALLED TO:  [id number], RN  Per Hospital Policy: Only change Specimen Src: to \"Line\" if  specified by physician " order.  Right AC    GRAM STAIN [[id number]] Collected: 12/21/23 1907    Order Status: Completed Specimen: Tissue Updated: 12/23/23 1254     Significant Indicator .     Source TISS     Site right foot tissue     Gram Stain Result Rare WBCs.  Many Gram positive cocci.  Few Gram negative rods.      Narrative:      1 - Foot tissue - Right foot tissue for culture  Surgery Specimen    Fungal Smear [[id number]] Collected: 12/21/23 1907    Order Status: Completed Specimen: Tissue Updated: 12/23/23 1254     Significant Indicator NEG     Source TISS     Site right foot tissue     Fungal Smear Results No fungal elements seen.    Narrative:      1 - Foot tissue - Right foot tissue for culture  Surgery Specimen    GRAM STAIN [[id number]] Collected: 12/21/23 1907    Order Status: Completed Specimen: Tissue Updated: 12/23/23 1253     Significant Indicator .     Source TISS     Site Right foot tissue     Gram Stain Result Rare WBCs.  Many mixed bacteria, no predominant organism seen.      Narrative:      2 - Foot tissue - Right foot tissue for culture  Surgery Specimen    Fungal Smear [[id number]] Collected: 12/21/23 1907    Order Status: Completed Specimen: Tissue Updated: 12/23/23 1253     Significant Indicator NEG     Source TISS     Site Right foot tissue     Fungal Smear Results No fungal elements seen.    Narrative:      2 - Foot tissue - Right foot tissue for culture  Surgery Specimen    CULTURE WOUND W/ GRAM STAIN [[id number]]  (Abnormal) Collected: 12/21/23 1228    Order Status: Completed Specimen: Wound from Right Foot Updated: 12/23/23 1100     Significant Indicator POS     Source WND     Site RIGHT FOOT     Culture Result Moderate growth usual skin alec.     Gram Stain Result Rare WBCs.  Many Gram positive cocci.  Few Gram negative rods.       Culture Result Pasteurella species  Light growth  Pasteurella dagmatis        Streptococcus agalactiae (Group B)  Rare growth      Narrative:      CALL  [facility]  MED tel.  "[phone],  CALLED  MED tel. [phone] 12/22/2023, 16:44, RB PERF. RESULTS CALLED  TO:Bob    BLOOD CULTURE [[id number]] Collected: 12/21/23 1228    Order Status: Completed Specimen: Blood from Peripheral Updated: 12/22/23 0732     Significant Indicator NEG     Source BLD     Site PERIPHERAL     Culture Result No Growth  Note: Blood cultures are incubated for 5 days and  are monitored continuously.Positive blood cultures  are called to the RN and reported as soon as  they are identified.  Blood culture testing and Gram stain, if indicated, are  performed at [facility], [address] [place] [address].  Positive blood cultures are  sent to [facility], [address] [place] [address], for organism identification and  susceptibility testing.      Narrative:      Per Hospital Policy: Only change Specimen Src: to \"Line\" if  specified by physician order.  Left AC    GRAM STAIN [[id number]] Collected: 12/21/23 1228    Order Status: Completed Specimen: Wound Updated: 12/21/23 1923     Significant Indicator .     Source WND     Site RIGHT FOOT     Gram Stain Result Rare WBCs.  Many Gram positive cocci.  Few Gram negative rods.      AFB Culture [[id number]] Collected: 12/21/23 1907    Order Status: Canceled Specimen: Other     AFB Culture [[id number]] Collected: 12/21/23 1907    Order Status: Canceled Specimen: Other           No results found for: \"BLOODCULTU\", \"BLDCULT\", \"BCHOLD\"     Studies:  IMPRESSION:     1.  Acute fracture of the base of the fifth metatarsal.     2.  Extensive soft tissue gas involving the mid and forefoot bilaterally likely representing tissue necrosis/gangrene.     3.  Demineralization of the fifth metatarsal possibly representing presence of osteomyelitis.  IMPRESSION:   Poorly controlled DM_Hgb A1C 9.7  Gangrene right foot-polymicrobial  -Per ER note he thought he was bitten by a spider  -Pictures reviewed in media-findings are not cw spider bite: " Gerardo gangrene and full-thickness necrosis of the skin extending from the dorsum of the foot over the lateral aspect of the midfoot, forefoot and 4th toe   -Culture results are also not cw spider bite  -Positive blood culture likely secondary to gangrene, likely anaerobe or Actino      PLAN:   Tetanus updated 12/21  Advised antibiotics will not cure this infection-he presented too late with extensive tissue necrosis and gangrene necessitating extensive debridement resulting in significant tissue loss  Additional debridement per Ortho  Significant risks are associated of prolonged IV/PO antibiotics, incl Cdiff colitis, infection/thrombosis PICC line, death, etc  As there little to no chance of salvaging foot, amputation is recommended as risks of antibiotics outweigh any potential benefit  Continue Zosyn for now given +blood culture  Keep BS under 150 to help control current infection  Final antibiotic recommendations per culture results and clinical course    High risk for amputation      Plan of care discussed with RICHY Cutler D.O.. Will continue to follow     Melissa Rivera M.D.

## 2023-12-24 NOTE — PROGRESS NOTES
Telemetry Shift Summary    Rhythm Sr  HR Range 81-93  Ectopy rPVC  Measurements 0.12/0.08/0.38    Normal Values  Rhythm SR  HR Range:   Measurements: 0.12-0.20/0.06-0.10/0.30-0.52

## 2023-12-25 LAB
ANION GAP SERPL CALC-SCNC: 7 MMOL/L (ref 7–16)
BUN SERPL-MCNC: 10 MG/DL (ref 8–22)
CALCIUM SERPL-MCNC: 7.3 MG/DL (ref 8.4–10.2)
CHLORIDE SERPL-SCNC: 98 MMOL/L (ref 96–112)
CO2 SERPL-SCNC: 26 MMOL/L (ref 20–33)
CREAT SERPL-MCNC: 1.27 MG/DL (ref 0.5–1.4)
ERYTHROCYTE [DISTWIDTH] IN BLOOD BY AUTOMATED COUNT: 38 FL (ref 35.9–50)
GFR SERPLBLD CREATININE-BSD FMLA CKD-EPI: 69 ML/MIN/1.73 M 2
GLUCOSE BLD STRIP.AUTO-MCNC: 166 MG/DL (ref 65–99)
GLUCOSE BLD STRIP.AUTO-MCNC: 177 MG/DL (ref 65–99)
GLUCOSE BLD STRIP.AUTO-MCNC: 196 MG/DL (ref 65–99)
GLUCOSE BLD STRIP.AUTO-MCNC: 202 MG/DL (ref 65–99)
GLUCOSE SERPL-MCNC: 172 MG/DL (ref 65–99)
HCT VFR BLD AUTO: 30 % (ref 42–52)
HGB BLD-MCNC: 10 G/DL (ref 14–18)
MCH RBC QN AUTO: 28.9 PG (ref 27–33)
MCHC RBC AUTO-ENTMCNC: 33.3 G/DL (ref 32.3–36.5)
MCV RBC AUTO: 86.7 FL (ref 81.4–97.8)
PLATELET # BLD AUTO: 293 K/UL (ref 164–446)
PMV BLD AUTO: 9.4 FL (ref 9–12.9)
POTASSIUM SERPL-SCNC: 4 MMOL/L (ref 3.6–5.5)
RBC # BLD AUTO: 3.46 M/UL (ref 4.7–6.1)
SODIUM SERPL-SCNC: 131 MMOL/L (ref 135–145)
WBC # BLD AUTO: 9.1 K/UL (ref 4.8–10.8)

## 2023-12-25 PROCEDURE — 700111 HCHG RX REV CODE 636 W/ 250 OVERRIDE (IP): Mod: JZ | Performed by: INTERNAL MEDICINE

## 2023-12-25 PROCEDURE — 97165 OT EVAL LOW COMPLEX 30 MIN: CPT

## 2023-12-25 PROCEDURE — 770020 HCHG ROOM/CARE - TELE (206)

## 2023-12-25 PROCEDURE — 700105 HCHG RX REV CODE 258: Performed by: HOSPITALIST

## 2023-12-25 PROCEDURE — 36415 COLL VENOUS BLD VENIPUNCTURE: CPT

## 2023-12-25 PROCEDURE — 700102 HCHG RX REV CODE 250 W/ 637 OVERRIDE(OP): Performed by: INTERNAL MEDICINE

## 2023-12-25 PROCEDURE — 82962 GLUCOSE BLOOD TEST: CPT

## 2023-12-25 PROCEDURE — 80048 BASIC METABOLIC PNL TOTAL CA: CPT

## 2023-12-25 PROCEDURE — 99232 SBSQ HOSP IP/OBS MODERATE 35: CPT | Performed by: INTERNAL MEDICINE

## 2023-12-25 PROCEDURE — 700105 HCHG RX REV CODE 258: Performed by: INTERNAL MEDICINE

## 2023-12-25 PROCEDURE — 94760 N-INVAS EAR/PLS OXIMETRY 1: CPT

## 2023-12-25 PROCEDURE — 700102 HCHG RX REV CODE 250 W/ 637 OVERRIDE(OP): Performed by: HOSPITALIST

## 2023-12-25 PROCEDURE — 97535 SELF CARE MNGMENT TRAINING: CPT

## 2023-12-25 PROCEDURE — A9270 NON-COVERED ITEM OR SERVICE: HCPCS | Performed by: INTERNAL MEDICINE

## 2023-12-25 PROCEDURE — 85027 COMPLETE CBC AUTOMATED: CPT

## 2023-12-25 PROCEDURE — A9270 NON-COVERED ITEM OR SERVICE: HCPCS | Performed by: HOSPITALIST

## 2023-12-25 RX ADMIN — INSULIN HUMAN 1 UNITS: 100 INJECTION, SOLUTION PARENTERAL at 21:02

## 2023-12-25 RX ADMIN — LINEZOLID 600 MG: 600 TABLET, FILM COATED ORAL at 04:47

## 2023-12-25 RX ADMIN — INSULIN HUMAN 1 UNITS: 100 INJECTION, SOLUTION PARENTERAL at 06:32

## 2023-12-25 RX ADMIN — INSULIN HUMAN 1 UNITS: 100 INJECTION, SOLUTION PARENTERAL at 11:31

## 2023-12-25 RX ADMIN — PIPERACILLIN AND TAZOBACTAM 4.5 G: 4; .5 INJECTION, POWDER, FOR SOLUTION INTRAVENOUS at 01:27

## 2023-12-25 RX ADMIN — INSULIN HUMAN 2 UNITS: 100 INJECTION, SOLUTION PARENTERAL at 17:42

## 2023-12-25 RX ADMIN — SODIUM CHLORIDE, POTASSIUM CHLORIDE, SODIUM LACTATE AND CALCIUM CHLORIDE: 600; 310; 30; 20 INJECTION, SOLUTION INTRAVENOUS at 21:01

## 2023-12-25 RX ADMIN — SODIUM CHLORIDE, POTASSIUM CHLORIDE, SODIUM LACTATE AND CALCIUM CHLORIDE: 600; 310; 30; 20 INJECTION, SOLUTION INTRAVENOUS at 09:05

## 2023-12-25 RX ADMIN — PIPERACILLIN AND TAZOBACTAM 4.5 G: 4; .5 INJECTION, POWDER, FOR SOLUTION INTRAVENOUS at 10:13

## 2023-12-25 RX ADMIN — OXYCODONE HYDROCHLORIDE 5 MG: 5 TABLET ORAL at 01:26

## 2023-12-25 RX ADMIN — PIPERACILLIN AND TAZOBACTAM 4.5 G: 4; .5 INJECTION, POWDER, FOR SOLUTION INTRAVENOUS at 17:50

## 2023-12-25 ASSESSMENT — ENCOUNTER SYMPTOMS
SPEECH CHANGE: 0
PHOTOPHOBIA: 0
FEVER: 0
SENSORY CHANGE: 0
MYALGIAS: 0
DIZZINESS: 0
COUGH: 0
ABDOMINAL PAIN: 0
SHORTNESS OF BREATH: 0
NERVOUS/ANXIOUS: 0
CONSTIPATION: 0
INSOMNIA: 0
BLURRED VISION: 0
CHILLS: 0
HEADACHES: 0
HEARTBURN: 0
CLAUDICATION: 0
DEPRESSION: 0
WEAKNESS: 0
DIARRHEA: 0
VOMITING: 0

## 2023-12-25 ASSESSMENT — COGNITIVE AND FUNCTIONAL STATUS - GENERAL
SUGGESTED CMS G CODE MODIFIER DAILY ACTIVITY: CJ
DAILY ACTIVITIY SCORE: 21
MOVING FROM LYING ON BACK TO SITTING ON SIDE OF FLAT BED: A LITTLE
WALKING IN HOSPITAL ROOM: A LITTLE
DRESSING REGULAR LOWER BODY CLOTHING: A LITTLE
HELP NEEDED FOR BATHING: A LITTLE
DRESSING REGULAR UPPER BODY CLOTHING: A LITTLE
HELP NEEDED FOR BATHING: A LITTLE
SUGGESTED CMS G CODE MODIFIER DAILY ACTIVITY: CJ
MOVING TO AND FROM BED TO CHAIR: A LITTLE
DAILY ACTIVITIY SCORE: 20
TURNING FROM BACK TO SIDE WHILE IN FLAT BAD: A LITTLE
STANDING UP FROM CHAIR USING ARMS: A LITTLE
TOILETING: A LITTLE
TOILETING: A LITTLE
DRESSING REGULAR LOWER BODY CLOTHING: A LITTLE

## 2023-12-25 ASSESSMENT — PAIN DESCRIPTION - PAIN TYPE
TYPE: ACUTE PAIN
TYPE: ACUTE PAIN

## 2023-12-25 ASSESSMENT — ACTIVITIES OF DAILY LIVING (ADL): TOILETING: INDEPENDENT

## 2023-12-25 ASSESSMENT — PATIENT HEALTH QUESTIONNAIRE - PHQ9
1. LITTLE INTEREST OR PLEASURE IN DOING THINGS: NOT AT ALL
SUM OF ALL RESPONSES TO PHQ9 QUESTIONS 1 AND 2: 0
2. FEELING DOWN, DEPRESSED, IRRITABLE, OR HOPELESS: NOT AT ALL

## 2023-12-25 ASSESSMENT — FIBROSIS 4 INDEX: FIB4 SCORE: 0.65

## 2023-12-25 NOTE — PROGRESS NOTES
Hospital Medicine Daily Progress Note    Date of Service  12/25/2023    Chief Complaint  Nestor cMkenzie Jr. is a 48 y.o. male admitted 12/21/2023 with black lesion on his right foot    Hospital Course  Patient is a 48-year-old male with diabetes who presented with black discoloration of the right toe with pain, redness and swelling.  Been doing well until 4 days prior when he thought he felt a spider bite on his right foot and since that time has had worsening pain redness with swelling.  He presented to the emergency room and had acute kidney injury, hyponatremia and elevated lactic acid consistent with sepsis.  Orthopedic surgery was consulted given the extensive nature of the necrosis.  He had large amount of debridement on 12/22 and tolerated the procedure well.  At this time patient is hoping to preserve as much of his foot as possible and is not interested in BKA unless it comes to know other options.    Interval Problem Update  12/23 patient is postop day 1 from extensive debridement of the right foot with wound VAC placement.  Patient states that he wants to save as much of his foot as possible and is not interested in BKA unless he tries all other options and is not prove successful.  12/24: Patient is slightly more open to amputation after discussion with infectious disease.  Given the large amount of tissue exposed with the dissection of the gangrene likely the only viable option to treat infection will be a below the knee amputation.  Patient still making ultimate decision as he not has not committed one way or the other.  12/25: Patient doing about the same.  He is much more agreeable to proceed with amputation at this time after discussion with infectious disease yesterday.  Will continue with Zosyn per ID recommendations and Zyvox discontinued.  Sodium and creatinine stable.  Anticipate discussion with orthopedic surgery tomorrow.    I have discussed this patient's plan of care and discharge plan  at IDT rounds today with Case Management, Nursing, Nursing leadership, and other members of the IDT team.    Consultants/Specialty  orthopedics    Code Status  Full Code    Disposition  The patient is not medically cleared for discharge to home or a post-acute facility.  Anticipate discharge to: home with close outpatient follow-up    I have placed the appropriate orders for post-discharge needs.    Review of Systems  Review of Systems   Constitutional:  Negative for chills and fever.   HENT:  Negative for congestion.    Eyes:  Negative for blurred vision and photophobia.   Respiratory:  Negative for cough and shortness of breath.    Cardiovascular:  Negative for chest pain, claudication and leg swelling.   Gastrointestinal:  Negative for abdominal pain, constipation, diarrhea, heartburn and vomiting.   Genitourinary:  Negative for dysuria and hematuria.   Musculoskeletal:  Negative for joint pain and myalgias.   Skin:  Negative for itching and rash.   Neurological:  Negative for dizziness, sensory change, speech change, weakness and headaches.   Psychiatric/Behavioral:  Negative for depression. The patient is not nervous/anxious and does not have insomnia.         Physical Exam  Temp:  [36.4 °C (97.5 °F)-37.4 °C (99.3 °F)] 36.8 °C (98.3 °F)  Pulse:  [82-89] 82  Resp:  [18-21] 18  BP: (150-165)/(87-90) 165/87  SpO2:  [93 %-98 %] 97 %    Physical Exam  Vitals and nursing note reviewed.   Constitutional:       General: He is not in acute distress.     Appearance: Normal appearance.   HENT:      Head: Normocephalic and atraumatic.   Eyes:      General: No scleral icterus.     Extraocular Movements: Extraocular movements intact.   Cardiovascular:      Rate and Rhythm: Normal rate and regular rhythm.      Pulses: Normal pulses.      Heart sounds: Normal heart sounds. No murmur heard.  Pulmonary:      Effort: Pulmonary effort is normal. No respiratory distress.      Breath sounds: Normal breath sounds. No wheezing,  rhonchi or rales.   Abdominal:      General: Abdomen is flat. Bowel sounds are normal. There is no distension.      Palpations: Abdomen is soft.      Tenderness: There is no rebound.   Musculoskeletal:         General: No swelling or tenderness.      Cervical back: Normal range of motion and neck supple.   Lymphadenopathy:      Cervical: No cervical adenopathy.   Skin:     Coloration: Skin is not jaundiced.      Findings: No erythema.      Comments: Right foot dressed and wound VAC in place, patient able to move toes, sensation intact  Wound pictures reviewed   Neurological:      General: No focal deficit present.      Mental Status: He is alert and oriented to person, place, and time. Mental status is at baseline.      Cranial Nerves: No cranial nerve deficit.   Psychiatric:         Mood and Affect: Mood normal.         Behavior: Behavior normal.         Fluids    Intake/Output Summary (Last 24 hours) at 12/25/2023 1306  Last data filed at 12/25/2023 1126  Gross per 24 hour   Intake 120 ml   Output 2175 ml   Net -2055 ml         Laboratory  Recent Labs     12/23/23  0126 12/24/23  0109 12/25/23  0032   WBC 11.6* 9.4 9.1   RBC 3.34* 3.32* 3.46*   HEMOGLOBIN 9.7* 9.3* 10.0*   HEMATOCRIT 29.2* 28.7* 30.0*   MCV 87.4 86.4 86.7   MCH 29.0 28.0 28.9   MCHC 33.2 32.4 33.3   RDW 39.0 38.5 38.0   PLATELETCT 244 274 293   MPV 10.0 9.7 9.4       Recent Labs     12/23/23  0126 12/24/23  0109 12/25/23  0032   SODIUM 130* 127* 131*   POTASSIUM 3.9 4.2 4.0   CHLORIDE 97 99 98   CO2 24 20 26   GLUCOSE 153* 232* 172*   BUN 15 12 10   CREATININE 1.39 1.24 1.27   CALCIUM 7.2* 7.1* 7.3*                     Imaging  DX-FOOT-COMPLETE 3+ RIGHT   Final Result      1.  Acute fracture of the base of the fifth metatarsal.      2.  Extensive soft tissue gas involving the mid and forefoot bilaterally likely representing tissue necrosis/gangrene.      3.  Demineralization of the fifth metatarsal possibly representing presence of osteomyelitis.  "     DX-CHEST-PORTABLE (1 VIEW)   Final Result      No evidence of acute cardiopulmonary process.           Assessment/Plan  * Sepsis (HCC)- (present on admission)  Assessment & Plan  Sepsis criteria has resolved      Hypomagnesemia  Assessment & Plan  Replace as needed  monitor    Leukocytosis  Assessment & Plan  In the setting of infection  Monitor  Continue antibiotics.    Abnormal EKG- (present on admission)  Assessment & Plan  Patient states he feels well  No need for echo    Cellulitis of right lower extremity- (present on admission)  Assessment & Plan  I will start Zosyn and Zyvox, follow on cultures and sensitivities  There is extensive soft tissue gas concerning for gangrene and necrosis  Orthopedics consulted     12/22: Patient underwent: \"Right foot irrigation and excisional debridement down to bone, Right foot toe amputation of the fourth and fifth toes at the metatarsal phalangeal joint, Right foot wound VAC placement\" by orthopedic surgery yesterday.  Orthopedic surgery does not believe that the limb is salvageable and they are planning on potentially doing a BKA but this will not happen until most likely early next week.  Orthopedic surgery to discuss this with patient today.  Will continue with antibiotics for now.  Pending cultures.  Once we have any culture results we will consult ID.  For now we will continue with Zyvox and Zosyn.    12/24: Preoperative wound positive for Pasteurella and group B strep, per ID continue with Zosyn, appreciate consultation    12/25: Continue with Zosyn, patient will need further amputation    Dehydration- (present on admission)  Assessment & Plan  Likely secondary to reduced oral intake, and increase in insensible loss due to infection.  Encourage oral intake as tolerated, antiemetics as needed.  Intravenous hydration until adequate oral intake is achieved.     Anemia- (present on admission)  Assessment & Plan  There could be an acute blood loss component due to " "recent surgery  Monitor for any signs of bleeding      Acute hematogenous osteomyelitis of right foot (HCC)- (present on admission)  Assessment & Plan  Continue Zosyn  Appreciate ID recommendations  Patient agreeable to proceed with BKA, has many questions for Ortho surgery prior to intervention.    Fracture of the fifth metatarsal- (present on admission)  Assessment & Plan  Orthopedics consulted     Type 2 diabetes mellitus with diabetic peripheral angiopathy and gangrene, without long-term current use of insulin (HCC)- (present on admission)  Assessment & Plan  With hyperglycemia  I will check a glycated hemoglobin    I will start short acting insulin for now  I will order Accu-Checks, hypoglycemia protocol  Adjust according to blood sugars trend.     Sugars under control    SRIRAM (acute kidney injury) (HCC)- (present on admission)  Assessment & Plan  Creatinine back to baseline 1.27    Hyponatremia- (present on admission)  Assessment & Plan  Seems to be improving  Continue with IVF for now    Gangrene associated with diabetes mellitus (HCC)- (present on admission)  Assessment & Plan        12/22: Patient underwent: \"Right foot irrigation and excisional debridement down to bone, Right foot toe amputation of the fourth and fifth toes at the metatarsal phalangeal joint, Right foot wound VAC placement\" by orthopedic surgery yesterday.  Orthopedic surgery does not believe that the limb is salvageable and they are planning on potentially doing a BKA but this will not happen until most likely early next week.  Orthopedic surgery to discuss this with patient today.  Will continue with antibiotics for now.  Pending cultures.  Once we have any culture results we will consult ID.  For now we will continue with Zyvox and Zosyn.    12/24: Patient now more amenable to the possibility of amputation as this will likely be the only viable option for recovery based on length of antibiotic and as also the significant debridement that " was necessary to dissect the gangrene.  Wound VAC in place and under this tendon and bone exposed.  Discuss further with orthopedic surgery    12/25: Patient in good spirits, he has come to terms with moving forward with amputation and does feel that this will give him the best opportunity for independence and resolution of infection.  Will discuss further with orthopedic surgery likely tomorrow.           VTE prophylaxis:   SCDs/TEDs      I have performed a physical exam and reviewed and updated ROS and Plan today (12/25/2023). In review of yesterday's note (12/24/2023), there are no changes except as documented above.

## 2023-12-25 NOTE — CARE PLAN
The patient is Stable - Low risk of patient condition declining or worsening    Shift Goals  Clinical Goals: Monitor for worsening s/s of infection; safety; IV antibiotics  Patient Goals: rest    Progress made toward(s) clinical / shift goals:  Monitor wound vac and IV Zosyn and PO Zyvox.     Problem: Knowledge Deficit - Standard  Goal: Patient and family/care givers will demonstrate understanding of plan of care, disease process/condition, diagnostic tests and medications  Outcome: Progressing     Problem: Fall Risk  Goal: Patient will remain free from falls  Outcome: Progressing       Patient is not progressing towards the following goals:N/A

## 2023-12-25 NOTE — PROGRESS NOTES
Telemetry Shift Summary     Rhythm SR  HR Range 83-94  Ectopy r-o PAC  Measurements  .10/.08/.40  Per strip printed 0400     Normal Values  Rhythm SR  HR Range    Measurements 0.12-0.20 / 0.06-0.10  / 0.30-0.52

## 2023-12-25 NOTE — PROGRESS NOTES
Wound cult +Pasteurella, Actino, Prevotella, Fusobacterium, MSSA, Staph caprae, Staph pettenkoferi, and GBS  Continue Zosyn for now  DC Zyvox  Final antibiotic recommendations per clinical course

## 2023-12-25 NOTE — THERAPY
Occupational Therapy   Initial Evaluation     Patient Name: Nestor Mckenzie Jr.  Age:  48 y.o., Sex:  male  Medical Record #: [id number]  Today's Date: 12/25/2023     Precautions  Precautions: (P) Fall Risk, Non Weight Bearing Right Lower Extremity, Other (See Comments) (Wound vac R foot)    Assessment  Patient is 48 y.o. male presented 12/21/23 discoloration R toe w/ pain/redness and swelling.  Admitted with a diagnosis of sepsis.  PMH DM.  Additional factors influencing patient status / progress: NWB RLE, wound vac RLE, impaired balance.  Pt resides alone in a 3rd floor apartment w/ stair access only in [place], [place].  Family in the area are available to provide assist on a limited basis.  PLOF Indep for ADL's, transfers and ambulation w/out a device.  Therapist reviewed environmental/home safety, fall precautions, AE/DME, ADL's and transfers.    Plan    Occupational Therapy Initial Treatment Plan   Treatment Interventions: (P) Self Care / Activities of Daily Living, Adaptive Equipment, Neuro Re-Education / Balance, Therapeutic Activity  Treatment Frequency: (P) 3 Times per Week  Duration: (P) Until Therapy Goals Met    DC Equipment Recommendations: (P) Unable to determine at this time  Discharge Recommendations: (P) Other - Continued skilled occupational therapy services Home Health vs Post Acute Placement depending upong functional gains and POC).    Subjective    Pt was alert and cooperative w/ tx.     Objective       12/25/23 0945    Services   Is patient using  services for this encounter? No   Initial Contact Note    Initial Contact Note Order Received and Verified, Occupational Therapy Evaluation in Progress with Full Report to Follow.   Prior Living Situation   Prior Services Home-Independent   Housing / Facility 3 Story Apartment / Condo   Steps Into Home 24   Steps In Home 0   Rail Right Rail (Steps into Home)   Elevator No   Bathroom Set up Bathtub / Shower Combination   Equipment Owned  None   Lives with - Patient's Self Care Capacity Alone and Able to Care For Self   Comments Pt resides alone in a 3rd floor apartment w/ stair access only in [place], [place].  Family in the area are available to provide assist on a limited basis.  PLOF Indep for ADL's, transfers and ambulation w/out a device.   Prior Level of ADL Function   Self Feeding Independent   Grooming / Hygiene Independent   Bathing Independent   Dressing Independent   Toileting Independent   Prior Level of IADL Function   Medication Management Independent   Laundry Independent   Kitchen Mobility Independent   Finances Independent   Home Management Independent   Shopping Independent   Prior Level Of Mobility Independent Without Device in Community;Independent With Steps in Community;Independent Without Device in Home;Independent With Steps in Home   Driving / Transportation Driving Independent   History of Falls   History of Falls No   Precautions   Precautions Fall Risk;Non Weight Bearing Right Lower Extremity;Other (See Comments)  (Wound vac R foot)   Pain   Intervention Declines   Cognition    Cognition / Consciousness WDL   Level of Consciousness Alert   Passive ROM Upper Body   Passive ROM Upper Body WDL   Active ROM Upper Body   Active ROM Upper Body  WDL   Dominant Hand Right   Strength Upper Body   Upper Body Strength  WDL   Sensation Upper Body   Upper Extremity Sensation  WDL   Upper Body Muscle Tone   Upper Body Muscle Tone  WDL   Coordination Upper Body   Coordination WDL   Balance Assessment   Sitting Balance (Static) Fair +   Sitting Balance (Dynamic) Fair   Standing Balance (Static) Fair   Standing Balance (Dynamic) Fair -   Weight Shift Sitting Fair   Weight Shift Standing Absent   Bed Mobility    Supine to Sit Supervised   Sit to Supine Supervised   ADL Assessment   Upper Body Dressing Modified Independent   Lower Body Dressing Minimal Assist   Toileting Minimal Assist   How much help from another person does the patient currently  need...   Putting on and taking off regular lower body clothing? 3   Bathing (including washing, rinsing, and drying)? 3   Toileting, which includes using a toilet, bedpan, or urinal? 3   Putting on and taking off regular upper body clothing? 4   Taking care of personal grooming such as brushing teeth? 4   Eating meals? 4   6 Clicks Daily Activity Score 21   Functional Mobility   Sit to Stand Supervised   Bed, Chair, Wheelchair Transfer Supervised   Toilet Transfers Minimal Assist  (SPT BSC via FWW)   Transfer Method Stand Pivot   Edema / Skin Assessment   Edema / Skin  Not Assessed   Short Term Goals   Short Term Goal # 1 Mod I LB clothing management via AE/DME   Short Term Goal # 2 Sup to transfer to/from commode via FWW + NWB RLE   Short Term Goal # 3 Sup toileting via AE/DME   Education Group   Education Provided Transfers;Role of Occupational Therapist;Activities of Daily Living;Adaptive Equipment;Use of Call Light;Weight Bearing Precautions   Role of Occupational Therapist Patient Response Patient;Acceptance;Explanation;Verbal Demonstration   Transfers Patient Response Patient;Acceptance;Explanation;Demonstration;Teach Back;Verbal Demonstration;Action Demonstration;Reinforcement Needed   ADL Patient Response Patient;Acceptance;Explanation;Demonstration;Teach Back;Verbal Demonstration;Action Demonstration;Reinforcement Needed   Adaptive Equipment Patient Response Patient;Acceptance;Explanation;Demonstration;Teach Back;Verbal Demonstration;Action Demonstration;Reinforcement Needed   Use of Call Light Patient Response Patient;Acceptance;Explanation;Demonstration;Verbal Demonstration   Weight Bearing Precautions Patient Response Patient;Acceptance;Explanation;Demonstration;Teach Back;Verbal Demonstration;Action Demonstration   Occupational Therapy Initial Treatment Plan    Treatment Interventions Self Care / Activities of Daily Living;Adaptive Equipment;Neuro Re-Education / Balance;Therapeutic Activity   Treatment  Frequency 3 Times per Week   Duration Until Therapy Goals Met   Anticipated Discharge Equipment and Recommendations   DC Equipment Recommendations Unable to determine at this time   Discharge Recommendations Other -  (Continued skilled occupational therapy services Home Health vs Post Acute Placement depending upong functional gains and POC)

## 2023-12-25 NOTE — CARE PLAN
Problem: Pain - Standard  Goal: Alleviation of pain or a reduction in pain to the patient’s comfort goal  Outcome: Progressing  Note: Pain medication per MAR     Problem: Fall Risk  Goal: Patient will remain free from falls  Outcome: Progressing  Note: Fall precaution in place   The patient is Stable - Low risk of patient condition declining or worsening    Shift Goals  Clinical Goals: Monitor for worsening s/s of infection; safety; IV antibiotics  Patient Goals: rest    Progress made toward(s) clinical / shift goals:  Educated on fall prevention,encouraged use of call light,updated on plan of care    Patient is not progressing towards the following goals:

## 2023-12-25 NOTE — PROGRESS NOTES
Telemetry Shift Summary    Rhythm SR  HR Range 77-90  Ectopy rpvc  Measurements 0.12/0.08/0.38    Normal Values  Rhythm SR  HR Range:   Measurements: 0.12-0.20/0.06-0.10/0.30-0.52

## 2023-12-25 NOTE — PROGRESS NOTES
Received report from [facility]. Patient awake, alert and oriented x4. Denies pain. Bed is locked and in lowest position. Bed alarm activated. Call light within reach.

## 2023-12-26 ENCOUNTER — ANESTHESIA (OUTPATIENT)
Dept: SURGERY | Facility: MEDICAL CENTER | Age: 48
DRG: 854 | End: 2023-12-26

## 2023-12-26 ENCOUNTER — ANESTHESIA EVENT (OUTPATIENT)
Dept: SURGERY | Facility: MEDICAL CENTER | Age: 48
DRG: 854 | End: 2023-12-26

## 2023-12-26 LAB
ANION GAP SERPL CALC-SCNC: 8 MMOL/L (ref 7–16)
BACTERIA BLD CULT: NORMAL
BACTERIA SPEC ANAEROBE CULT: ABNORMAL
BACTERIA TISS AEROBE CULT: ABNORMAL
BUN SERPL-MCNC: 10 MG/DL (ref 8–22)
CALCIUM SERPL-MCNC: 7.3 MG/DL (ref 8.4–10.2)
CHLORIDE SERPL-SCNC: 97 MMOL/L (ref 96–112)
CO2 SERPL-SCNC: 26 MMOL/L (ref 20–33)
CREAT SERPL-MCNC: 1.2 MG/DL (ref 0.5–1.4)
ERYTHROCYTE [DISTWIDTH] IN BLOOD BY AUTOMATED COUNT: 37 FL (ref 35.9–50)
GFR SERPLBLD CREATININE-BSD FMLA CKD-EPI: 74 ML/MIN/1.73 M 2
GLUCOSE BLD STRIP.AUTO-MCNC: 106 MG/DL (ref 65–99)
GLUCOSE BLD STRIP.AUTO-MCNC: 117 MG/DL (ref 65–99)
GLUCOSE BLD STRIP.AUTO-MCNC: 119 MG/DL (ref 65–99)
GLUCOSE BLD STRIP.AUTO-MCNC: 121 MG/DL (ref 65–99)
GLUCOSE SERPL-MCNC: 155 MG/DL (ref 65–99)
GRAM STN SPEC: ABNORMAL
GRAM STN SPEC: ABNORMAL
HCT VFR BLD AUTO: 30 % (ref 42–52)
HGB BLD-MCNC: 10 G/DL (ref 14–18)
MCH RBC QN AUTO: 28.7 PG (ref 27–33)
MCHC RBC AUTO-ENTMCNC: 33.3 G/DL (ref 32.3–36.5)
MCV RBC AUTO: 86 FL (ref 81.4–97.8)
PATHOLOGY CONSULT NOTE: NORMAL
PLATELET # BLD AUTO: 293 K/UL (ref 164–446)
PMV BLD AUTO: 9.3 FL (ref 9–12.9)
POTASSIUM SERPL-SCNC: 3.8 MMOL/L (ref 3.6–5.5)
RBC # BLD AUTO: 3.49 M/UL (ref 4.7–6.1)
SIGNIFICANT IND 70042: ABNORMAL
SIGNIFICANT IND 70042: NORMAL
SITE SITE: ABNORMAL
SITE SITE: NORMAL
SODIUM SERPL-SCNC: 131 MMOL/L (ref 135–145)
SOURCE SOURCE: ABNORMAL
SOURCE SOURCE: NORMAL
WBC # BLD AUTO: 8.3 K/UL (ref 4.8–10.8)

## 2023-12-26 PROCEDURE — 88311 DECALCIFY TISSUE: CPT

## 2023-12-26 PROCEDURE — 160036 HCHG PACU - EA ADDL 30 MINS PHASE I: Performed by: ORTHOPAEDIC SURGERY

## 2023-12-26 PROCEDURE — 700111 HCHG RX REV CODE 636 W/ 250 OVERRIDE (IP): Mod: JZ | Performed by: ANESTHESIOLOGY

## 2023-12-26 PROCEDURE — 85027 COMPLETE CBC AUTOMATED: CPT

## 2023-12-26 PROCEDURE — 700111 HCHG RX REV CODE 636 W/ 250 OVERRIDE (IP): Mod: JZ | Performed by: STUDENT IN AN ORGANIZED HEALTH CARE EDUCATION/TRAINING PROGRAM

## 2023-12-26 PROCEDURE — 700105 HCHG RX REV CODE 258: Performed by: HOSPITALIST

## 2023-12-26 PROCEDURE — 700105 HCHG RX REV CODE 258: Performed by: ORTHOPAEDIC SURGERY

## 2023-12-26 PROCEDURE — 160002 HCHG RECOVERY MINUTES (STAT): Performed by: ORTHOPAEDIC SURGERY

## 2023-12-26 PROCEDURE — 160028 HCHG SURGERY MINUTES - 1ST 30 MINS LEVEL 3: Performed by: ORTHOPAEDIC SURGERY

## 2023-12-26 PROCEDURE — 80048 BASIC METABOLIC PNL TOTAL CA: CPT

## 2023-12-26 PROCEDURE — 88307 TISSUE EXAM BY PATHOLOGIST: CPT

## 2023-12-26 PROCEDURE — 700101 HCHG RX REV CODE 250: Performed by: ANESTHESIOLOGY

## 2023-12-26 PROCEDURE — 160039 HCHG SURGERY MINUTES - EA ADDL 1 MIN LEVEL 3: Performed by: ORTHOPAEDIC SURGERY

## 2023-12-26 PROCEDURE — 700111 HCHG RX REV CODE 636 W/ 250 OVERRIDE (IP): Mod: JZ | Performed by: INTERNAL MEDICINE

## 2023-12-26 PROCEDURE — 160009 HCHG ANES TIME/MIN: Performed by: ORTHOPAEDIC SURGERY

## 2023-12-26 PROCEDURE — 700101 HCHG RX REV CODE 250: Performed by: STUDENT IN AN ORGANIZED HEALTH CARE EDUCATION/TRAINING PROGRAM

## 2023-12-26 PROCEDURE — 700111 HCHG RX REV CODE 636 W/ 250 OVERRIDE (IP): Performed by: ORTHOPAEDIC SURGERY

## 2023-12-26 PROCEDURE — 36415 COLL VENOUS BLD VENIPUNCTURE: CPT

## 2023-12-26 PROCEDURE — 99233 SBSQ HOSP IP/OBS HIGH 50: CPT | Performed by: INTERNAL MEDICINE

## 2023-12-26 PROCEDURE — 160048 HCHG OR STATISTICAL LEVEL 1-5: Performed by: ORTHOPAEDIC SURGERY

## 2023-12-26 PROCEDURE — 160035 HCHG PACU - 1ST 60 MINS PHASE I: Performed by: ORTHOPAEDIC SURGERY

## 2023-12-26 PROCEDURE — 82962 GLUCOSE BLOOD TEST: CPT

## 2023-12-26 PROCEDURE — 700102 HCHG RX REV CODE 250 W/ 637 OVERRIDE(OP): Performed by: HOSPITALIST

## 2023-12-26 PROCEDURE — 770006 HCHG ROOM/CARE - MED/SURG/GYN SEMI*

## 2023-12-26 PROCEDURE — 700105 HCHG RX REV CODE 258: Performed by: INTERNAL MEDICINE

## 2023-12-26 PROCEDURE — 99232 SBSQ HOSP IP/OBS MODERATE 35: CPT | Performed by: INTERNAL MEDICINE

## 2023-12-26 PROCEDURE — 94760 N-INVAS EAR/PLS OXIMETRY 1: CPT

## 2023-12-26 PROCEDURE — 0Y6H0Z3 DETACHMENT AT RIGHT LOWER LEG, LOW, OPEN APPROACH: ICD-10-PCS | Performed by: ORTHOPAEDIC SURGERY

## 2023-12-26 PROCEDURE — A9270 NON-COVERED ITEM OR SERVICE: HCPCS | Performed by: HOSPITALIST

## 2023-12-26 RX ORDER — HYDRALAZINE HYDROCHLORIDE 20 MG/ML
5 INJECTION INTRAMUSCULAR; INTRAVENOUS
Status: DISCONTINUED | OUTPATIENT
Start: 2023-12-26 | End: 2023-12-26 | Stop reason: HOSPADM

## 2023-12-26 RX ORDER — BUPIVACAINE HYDROCHLORIDE 2.5 MG/ML
INJECTION, SOLUTION EPIDURAL; INFILTRATION; INTRACAUDAL
Status: DISCONTINUED | OUTPATIENT
Start: 2023-12-26 | End: 2023-12-26 | Stop reason: HOSPADM

## 2023-12-26 RX ORDER — LIDOCAINE HYDROCHLORIDE 20 MG/ML
INJECTION, SOLUTION EPIDURAL; INFILTRATION; INTRACAUDAL; PERINEURAL PRN
Status: DISCONTINUED | OUTPATIENT
Start: 2023-12-26 | End: 2023-12-26 | Stop reason: SURG

## 2023-12-26 RX ORDER — SODIUM CHLORIDE, SODIUM LACTATE, POTASSIUM CHLORIDE, CALCIUM CHLORIDE 600; 310; 30; 20 MG/100ML; MG/100ML; MG/100ML; MG/100ML
INJECTION, SOLUTION INTRAVENOUS CONTINUOUS
Status: ACTIVE | OUTPATIENT
Start: 2023-12-26 | End: 2023-12-26

## 2023-12-26 RX ORDER — LABETALOL HYDROCHLORIDE 5 MG/ML
INJECTION, SOLUTION INTRAVENOUS PRN
Status: DISCONTINUED | OUTPATIENT
Start: 2023-12-26 | End: 2023-12-26 | Stop reason: SURG

## 2023-12-26 RX ORDER — ONDANSETRON 2 MG/ML
INJECTION INTRAMUSCULAR; INTRAVENOUS PRN
Status: DISCONTINUED | OUTPATIENT
Start: 2023-12-26 | End: 2023-12-26 | Stop reason: SURG

## 2023-12-26 RX ORDER — LABETALOL HYDROCHLORIDE 5 MG/ML
5 INJECTION, SOLUTION INTRAVENOUS
Status: DISCONTINUED | OUTPATIENT
Start: 2023-12-26 | End: 2023-12-26 | Stop reason: HOSPADM

## 2023-12-26 RX ORDER — MIDAZOLAM HYDROCHLORIDE 1 MG/ML
INJECTION INTRAMUSCULAR; INTRAVENOUS PRN
Status: DISCONTINUED | OUTPATIENT
Start: 2023-12-26 | End: 2023-12-26 | Stop reason: SURG

## 2023-12-26 RX ORDER — MAGNESIUM SULFATE HEPTAHYDRATE 40 MG/ML
INJECTION, SOLUTION INTRAVENOUS PRN
Status: DISCONTINUED | OUTPATIENT
Start: 2023-12-26 | End: 2023-12-26 | Stop reason: SURG

## 2023-12-26 RX ORDER — OXYCODONE HCL 5 MG/5 ML
10 SOLUTION, ORAL ORAL
Status: DISCONTINUED | OUTPATIENT
Start: 2023-12-26 | End: 2023-12-26 | Stop reason: HOSPADM

## 2023-12-26 RX ORDER — HYDROMORPHONE HYDROCHLORIDE 2 MG/ML
INJECTION, SOLUTION INTRAMUSCULAR; INTRAVENOUS; SUBCUTANEOUS PRN
Status: DISCONTINUED | OUTPATIENT
Start: 2023-12-26 | End: 2023-12-26 | Stop reason: SURG

## 2023-12-26 RX ORDER — OXYCODONE HCL 5 MG/5 ML
5 SOLUTION, ORAL ORAL
Status: DISCONTINUED | OUTPATIENT
Start: 2023-12-26 | End: 2023-12-26 | Stop reason: HOSPADM

## 2023-12-26 RX ORDER — DIPHENHYDRAMINE HYDROCHLORIDE 50 MG/ML
12.5 INJECTION INTRAMUSCULAR; INTRAVENOUS
Status: DISCONTINUED | OUTPATIENT
Start: 2023-12-26 | End: 2023-12-26 | Stop reason: HOSPADM

## 2023-12-26 RX ORDER — EPHEDRINE SULFATE 50 MG/ML
5 INJECTION, SOLUTION INTRAVENOUS
Status: DISCONTINUED | OUTPATIENT
Start: 2023-12-26 | End: 2023-12-26 | Stop reason: HOSPADM

## 2023-12-26 RX ORDER — SODIUM CHLORIDE, SODIUM LACTATE, POTASSIUM CHLORIDE, CALCIUM CHLORIDE 600; 310; 30; 20 MG/100ML; MG/100ML; MG/100ML; MG/100ML
INJECTION, SOLUTION INTRAVENOUS CONTINUOUS
Status: DISCONTINUED | OUTPATIENT
Start: 2023-12-26 | End: 2023-12-26 | Stop reason: HOSPADM

## 2023-12-26 RX ORDER — VANCOMYCIN HYDROCHLORIDE 1 G/20ML
INJECTION, POWDER, LYOPHILIZED, FOR SOLUTION INTRAVENOUS
Status: COMPLETED | OUTPATIENT
Start: 2023-12-26 | End: 2023-12-26

## 2023-12-26 RX ORDER — EPHEDRINE SULFATE 50 MG/ML
INJECTION, SOLUTION INTRAVENOUS PRN
Status: DISCONTINUED | OUTPATIENT
Start: 2023-12-26 | End: 2023-12-26

## 2023-12-26 RX ORDER — HYDRALAZINE HYDROCHLORIDE 20 MG/ML
20 INJECTION INTRAMUSCULAR; INTRAVENOUS EVERY 6 HOURS PRN
Status: DISCONTINUED | OUTPATIENT
Start: 2023-12-26 | End: 2023-12-29 | Stop reason: HOSPADM

## 2023-12-26 RX ORDER — HYDROMORPHONE HYDROCHLORIDE 1 MG/ML
0.4 INJECTION, SOLUTION INTRAMUSCULAR; INTRAVENOUS; SUBCUTANEOUS
Status: DISCONTINUED | OUTPATIENT
Start: 2023-12-26 | End: 2023-12-26 | Stop reason: HOSPADM

## 2023-12-26 RX ORDER — MEPERIDINE HYDROCHLORIDE 25 MG/ML
12.5 INJECTION INTRAMUSCULAR; INTRAVENOUS; SUBCUTANEOUS
Status: DISCONTINUED | OUTPATIENT
Start: 2023-12-26 | End: 2023-12-26 | Stop reason: HOSPADM

## 2023-12-26 RX ORDER — MORPHINE SULFATE 0.5 MG/ML
INJECTION, SOLUTION EPIDURAL; INTRATHECAL; INTRAVENOUS PRN
Status: DISCONTINUED | OUTPATIENT
Start: 2023-12-26 | End: 2023-12-26 | Stop reason: SURG

## 2023-12-26 RX ORDER — HALOPERIDOL 5 MG/ML
1 INJECTION INTRAMUSCULAR
Status: DISCONTINUED | OUTPATIENT
Start: 2023-12-26 | End: 2023-12-26 | Stop reason: HOSPADM

## 2023-12-26 RX ORDER — ROCURONIUM BROMIDE 10 MG/ML
INJECTION, SOLUTION INTRAVENOUS PRN
Status: DISCONTINUED | OUTPATIENT
Start: 2023-12-26 | End: 2023-12-26 | Stop reason: SURG

## 2023-12-26 RX ORDER — ONDANSETRON 2 MG/ML
4 INJECTION INTRAMUSCULAR; INTRAVENOUS
Status: DISCONTINUED | OUTPATIENT
Start: 2023-12-26 | End: 2023-12-26 | Stop reason: HOSPADM

## 2023-12-26 RX ORDER — PHENYLEPHRINE HYDROCHLORIDE 10 MG/ML
INJECTION, SOLUTION INTRAMUSCULAR; INTRAVENOUS; SUBCUTANEOUS PRN
Status: DISCONTINUED | OUTPATIENT
Start: 2023-12-26 | End: 2023-12-26 | Stop reason: SURG

## 2023-12-26 RX ORDER — HYDROMORPHONE HYDROCHLORIDE 1 MG/ML
0.1 INJECTION, SOLUTION INTRAMUSCULAR; INTRAVENOUS; SUBCUTANEOUS
Status: DISCONTINUED | OUTPATIENT
Start: 2023-12-26 | End: 2023-12-26 | Stop reason: HOSPADM

## 2023-12-26 RX ORDER — HYDROMORPHONE HYDROCHLORIDE 1 MG/ML
0.2 INJECTION, SOLUTION INTRAMUSCULAR; INTRAVENOUS; SUBCUTANEOUS
Status: DISCONTINUED | OUTPATIENT
Start: 2023-12-26 | End: 2023-12-26 | Stop reason: HOSPADM

## 2023-12-26 RX ADMIN — MAGNESIUM SULFATE HEPTAHYDRATE 4 G: 2 INJECTION, SOLUTION INTRAVENOUS at 15:46

## 2023-12-26 RX ADMIN — HYDROMORPHONE HYDROCHLORIDE 0.6 MG: 2 INJECTION INTRAMUSCULAR; INTRAVENOUS; SUBCUTANEOUS at 16:27

## 2023-12-26 RX ADMIN — HYDROMORPHONE HYDROCHLORIDE 0.4 MG: 2 INJECTION INTRAMUSCULAR; INTRAVENOUS; SUBCUTANEOUS at 15:54

## 2023-12-26 RX ADMIN — SODIUM CHLORIDE, POTASSIUM CHLORIDE, SODIUM LACTATE AND CALCIUM CHLORIDE: 600; 310; 30; 20 INJECTION, SOLUTION INTRAVENOUS at 20:50

## 2023-12-26 RX ADMIN — LIDOCAINE HYDROCHLORIDE 100 MG: 20 INJECTION, SOLUTION EPIDURAL; INFILTRATION; INTRACAUDAL at 15:00

## 2023-12-26 RX ADMIN — LABETALOL HYDROCHLORIDE 5 MG: 5 INJECTION, SOLUTION INTRAVENOUS at 15:52

## 2023-12-26 RX ADMIN — PIPERACILLIN AND TAZOBACTAM 4.5 G: 4; .5 INJECTION, POWDER, FOR SOLUTION INTRAVENOUS at 11:18

## 2023-12-26 RX ADMIN — FENTANYL CITRATE 100 MCG: 50 INJECTION, SOLUTION INTRAMUSCULAR; INTRAVENOUS at 14:53

## 2023-12-26 RX ADMIN — PROPOFOL 200 MG: 10 INJECTION, EMULSION INTRAVENOUS at 15:00

## 2023-12-26 RX ADMIN — SODIUM CHLORIDE, POTASSIUM CHLORIDE, SODIUM LACTATE AND CALCIUM CHLORIDE: 600; 310; 30; 20 INJECTION, SOLUTION INTRAVENOUS at 14:53

## 2023-12-26 RX ADMIN — PHENYLEPHRINE HYDROCHLORIDE 100 MCG: 10 INJECTION INTRAVENOUS at 15:00

## 2023-12-26 RX ADMIN — HYDROMORPHONE HYDROCHLORIDE 0.4 MG: 2 INJECTION INTRAMUSCULAR; INTRAVENOUS; SUBCUTANEOUS at 15:57

## 2023-12-26 RX ADMIN — ONDANSETRON 4 MG: 2 INJECTION INTRAMUSCULAR; INTRAVENOUS at 15:52

## 2023-12-26 RX ADMIN — KETAMINE HYDROCHLORIDE 25 MG: 50 INJECTION INTRAMUSCULAR; INTRAVENOUS at 15:38

## 2023-12-26 RX ADMIN — LABETALOL HYDROCHLORIDE 5 MG: 5 INJECTION, SOLUTION INTRAVENOUS at 15:45

## 2023-12-26 RX ADMIN — SUGAMMADEX 200 MG: 100 INJECTION, SOLUTION INTRAVENOUS at 16:44

## 2023-12-26 RX ADMIN — KETAMINE HYDROCHLORIDE 10 MG: 50 INJECTION INTRAMUSCULAR; INTRAVENOUS at 16:02

## 2023-12-26 RX ADMIN — ROCURONIUM BROMIDE 70 MG: 50 INJECTION, SOLUTION INTRAVENOUS at 15:01

## 2023-12-26 RX ADMIN — PIPERACILLIN AND TAZOBACTAM 4.5 G: 4; .5 INJECTION, POWDER, FOR SOLUTION INTRAVENOUS at 02:36

## 2023-12-26 RX ADMIN — OXYCODONE HYDROCHLORIDE 5 MG: 5 TABLET ORAL at 19:49

## 2023-12-26 RX ADMIN — FENTANYL CITRATE 100 MCG: 50 INJECTION, SOLUTION INTRAMUSCULAR; INTRAVENOUS at 15:17

## 2023-12-26 RX ADMIN — SODIUM CHLORIDE, POTASSIUM CHLORIDE, SODIUM LACTATE AND CALCIUM CHLORIDE: 600; 310; 30; 20 INJECTION, SOLUTION INTRAVENOUS at 05:58

## 2023-12-26 RX ADMIN — MORPHINE SULFATE 3 MG: 0.5 INJECTION EPIDURAL; INTRATHECAL; INTRAVENOUS at 16:05

## 2023-12-26 RX ADMIN — MORPHINE SULFATE 2 MG: 0.5 INJECTION EPIDURAL; INTRATHECAL; INTRAVENOUS at 16:15

## 2023-12-26 RX ADMIN — MIDAZOLAM HYDROCHLORIDE 1 MG: 1 INJECTION, SOLUTION INTRAMUSCULAR; INTRAVENOUS at 15:45

## 2023-12-26 ASSESSMENT — COGNITIVE AND FUNCTIONAL STATUS - GENERAL
DRESSING REGULAR LOWER BODY CLOTHING: A LITTLE
DRESSING REGULAR UPPER BODY CLOTHING: A LITTLE
STANDING UP FROM CHAIR USING ARMS: A LOT
CLIMB 3 TO 5 STEPS WITH RAILING: TOTAL
DAILY ACTIVITIY SCORE: 18
SUGGESTED CMS G CODE MODIFIER DAILY ACTIVITY: CK
WALKING IN HOSPITAL ROOM: A LOT
SUGGESTED CMS G CODE MODIFIER MOBILITY: CL
MOVING TO AND FROM BED TO CHAIR: A LITTLE
MOVING FROM LYING ON BACK TO SITTING ON SIDE OF FLAT BED: A LOT
HELP NEEDED FOR BATHING: A LOT
TOILETING: A LOT
MOBILITY SCORE: 13
TURNING FROM BACK TO SIDE WHILE IN FLAT BAD: A LITTLE

## 2023-12-26 ASSESSMENT — ENCOUNTER SYMPTOMS
FEVER: 0
DIZZINESS: 0
VOMITING: 0
HEARTBURN: 0
CONSTIPATION: 0
WEAKNESS: 0
COUGH: 0
MYALGIAS: 1
SHORTNESS OF BREATH: 0
DEPRESSION: 0
MYALGIAS: 0
SENSORY CHANGE: 0
NERVOUS/ANXIOUS: 0
BLURRED VISION: 0
SPEECH CHANGE: 0
INSOMNIA: 0
ABDOMINAL PAIN: 0
CLAUDICATION: 0
NAUSEA: 0
DIARRHEA: 0
PHOTOPHOBIA: 0
NERVOUS/ANXIOUS: 1
HEADACHES: 0
CHILLS: 0

## 2023-12-26 ASSESSMENT — PAIN DESCRIPTION - PAIN TYPE
TYPE: SURGICAL PAIN
TYPE: ACUTE PAIN
TYPE: SURGICAL PAIN
TYPE: ACUTE PAIN

## 2023-12-26 ASSESSMENT — FIBROSIS 4 INDEX: FIB4 SCORE: 0.65

## 2023-12-26 NOTE — PROGRESS NOTES
Infectious Disease Progress Note    Author: Melissa Rivera M.D. Date & Time of service: 2023  2:04 PM    Chief Complaint:   Gangrene right foot  Bacteremia due to hernan    Interval History:  48 y.o. diabetic male admitted 2023 for above   AF WBC 8.3 states anxious about surgery otherwise OK  Labs Reviewed and Medications Reviewed.    Review of Systems:  Review of Systems   Constitutional:  Negative for fever.   Gastrointestinal:  Negative for nausea and vomiting.   Musculoskeletal:  Positive for myalgias.   Psychiatric/Behavioral:  The patient is nervous/anxious.    All other systems reviewed and are negative.      Hemodynamics:  Temp (24hrs), Av.8 °C (98.2 °F), Min:36.3 °C (97.3 °F), Max:36.9 °C (98.5 °F)  Temperature: 36.9 °C (98.5 °F)  Pulse  Av.6  Min: 75  Max: 108   Blood Pressure: (!) 179/96 (RN aware)       Physical Exam:  Physical Exam  Vitals and nursing note reviewed.   Constitutional:       General: He is not in acute distress.     Appearance: He is not ill-appearing, toxic-appearing or diaphoretic.   Eyes:      General: No scleral icterus.     Extraocular Movements: Extraocular movements intact.      Pupils: Pupils are equal, round, and reactive to light.   Cardiovascular:      Rate and Rhythm: Normal rate.   Pulmonary:      Effort: Pulmonary effort is normal. No respiratory distress.   Abdominal:      General: There is no distension.   Musculoskeletal:      Comments: Wound VAC   Neurological:      General: No focal deficit present.      Mental Status: He is alert and oriented to person, place, and time.         Meds:    Current Facility-Administered Medications:     [MAR Hold] hydrALAZINE    [MAR Hold] tetanus-dipth-acell pertussis    [MAR Hold] piperacillin-tazobactam    [MAR Hold] acetaminophen    [MAR Hold] senna-docusate **AND** [MAR Hold] polyethylene glycol/lytes **AND** [MAR Hold] magnesium hydroxide **AND** [MAR Hold] bisacodyl    [MAR Hold] LR    LR    [MAR Hold]  LR    Notify provider if pain remains uncontrolled **AND** Use the Numeric Rating Scale (NRS), Ybarra-Baker Faces (WBF), or FLACC on regular floors and Critical-Care Pain Observation Tool (CPOT) on ICUs/Trauma to assess pain **AND** Pulse Ox **AND** [MAR Hold] Pharmacy Consult Request **AND** If patient difficult to arouse and/or has respiratory depression (respiratory rate of 10 or less), stop any opiates that are currently infusing and call a Rapid Response.    [MAR Hold] oxyCODONE immediate-release **OR** [MAR Hold] oxyCODONE immediate-release **OR** [MAR Hold] HYDROmorphone    [MAR Hold] insulin regular **AND** POC blood glucose manual result **AND** NOTIFY MD and PharmD **AND** Administer 20 grams of glucose (approximately 8 ounces of fruit juice) every 15 minutes PRN FSBG less than 70 mg/dL **AND** [MAR Hold] dextrose bolus    Labs:  Recent Labs     12/24/23  0109 12/25/23  0032 12/26/23  0024   WBC 9.4 9.1 8.3   RBC 3.32* 3.46* 3.49*   HEMOGLOBIN 9.3* 10.0* 10.0*   HEMATOCRIT 28.7* 30.0* 30.0*   MCV 86.4 86.7 86.0   MCH 28.0 28.9 28.7   RDW 38.5 38.0 37.0   PLATELETCT 274 293 293   MPV 9.7 9.4 9.3     Recent Labs     12/24/23  0109 12/25/23  0032 12/26/23  0024   SODIUM 127* 131* 131*   POTASSIUM 4.2 4.0 3.8   CHLORIDE 99 98 97   CO2 20 26 26   GLUCOSE 232* 172* 155*   BUN 12 10 10     Recent Labs     12/24/23  0109 12/25/23  0032 12/26/23  0024   CREATININE 1.24 1.27 1.20       Imaging:  DX-FOOT-COMPLETE 3+ RIGHT    Result Date: 12/21/2023 12/21/2023 12:07 PM HISTORY/REASON FOR EXAM: Necrotic foot. TECHNIQUE/EXAM DESCRIPTION AND NUMBER OF VIEWS: 3 nonweightbearing views of the RIGHT foot. COMPARISON: FINDINGS: There is some demineralization of the fifth metatarsal. There is a mildly distracted fracture involving the base of the fifth metatarsal. There is extensive gas within the soft tissues of the mid and forefoot laterally. There is vascular calcification.     1.  Acute fracture of the base of the fifth  metatarsal. 2.  Extensive soft tissue gas involving the mid and forefoot bilaterally likely representing tissue necrosis/gangrene. 3.  Demineralization of the fifth metatarsal possibly representing presence of osteomyelitis.    DX-CHEST-PORTABLE (1 VIEW)    Result Date: 12/21/2023 12/21/2023 12:06 PM HISTORY/REASON FOR EXAM: Sepsis TECHNIQUE/EXAM DESCRIPTION AND NUMBER OF VIEWS: Single portable view of the chest. COMPARISON: None FINDINGS: There is no evidence of focal consolidation or evidence of pulmonary edema. There is no pleural effusion. The heart is normal in size.     No evidence of acute cardiopulmonary process.      Micro:  Results       Procedure Component Value Units Date/Time    GRAM STAIN [[id number]] Collected: 12/21/23 1907    Order Status: Completed Specimen: Tissue Updated: 12/26/23 1056     Significant Indicator .     Source TISS     Site Right foot tissue     Gram Stain Result Rare WBCs.  Many mixed bacteria, no predominant organism seen.      Narrative:      2 - Foot tissue - Right foot tissue for culture  Surgery Specimen    Fungal Smear [[id number]] Collected: 12/21/23 1907    Order Status: Completed Specimen: Tissue Updated: 12/26/23 1056     Significant Indicator NEG     Source TISS     Site Right foot tissue     Fungal Smear Results No fungal elements seen.    Narrative:      2 - Foot tissue - Right foot tissue for culture  Surgery Specimen    Fungal Culture [[id number]] Collected: 12/21/23 1907    Order Status: Completed Specimen: Tissue Updated: 12/26/23 1056     Significant Indicator NEG     Source TISS     Site Right foot tissue     Culture Result Culture in progress.     Fungal Smear Results No fungal elements seen.    Narrative:      2 - Foot tissue - Right foot tissue for culture  Surgery Specimen    Anaerobic Culture [[id number]]  (Abnormal) Collected: 12/21/23 1907    Order Status: Completed Specimen: Tissue Updated: 12/26/23 1056     Significant Indicator POS     Source TISS     Site  Right foot tissue     Culture Result -      Prevotella sp.  Moderate growth  Prevotella bergensis  Organism identified by [employer] research use only library.        Fusobacterium canifelinum  Rare growth      Narrative:      2 - Foot tissue - Right foot tissue for culture  Surgery Specimen    CULTURE TISSUE W/ GRM STAIN [[id number]]  (Abnormal)  (Susceptibility) Collected: 12/21/23 2020    Order Status: Completed Specimen: Tissue Updated: 12/26/23 1056     Significant Indicator POS     Source TISS     Site Right foot tissue     Culture Result -     Gram Stain Result Rare WBCs.  Many mixed bacteria, no predominant organism seen.       Culture Result Actinomyces turicensis  Moderate growth        Streptococcus agalactiae (Group B)  Light growth        Staphylococcus aureus  Light growth  Methicillin sensitive by screening method  See previous culture for sensitivity report.        Staphylococcus caprae  Light growth        Staphylococcus pettenkoferi  Light growth      Narrative:      2 - Foot tissue - Right foot tissue for culture  Surgery Specimen    Susceptibility       Staphylococcus caprae (4)       Antibiotic Interpretation Microscan   Method Status    Clindamycin Sensitive <=0.25 mcg/mL TAMIKA Final    Cefazolin Sensitive <=8 mcg/mL TAMIKA Final    Cefepime Sensitive <=4 mcg/mL TAMIKA Final    Daptomycin Sensitive <=0.5 mcg/mL TAMIKA Final    Erythromycin Sensitive <=0.25 mcg/mL TAMIKA Final    Ampicillin/sulbactam Sensitive <=8/4 mcg/mL TAMIKA Final    Vancomycin Sensitive 1 mcg/mL TAMIKA Final    Oxacillin Sensitive <=0.25 mcg/mL TAMIKA Final    Trimeth/Sulfa Sensitive <=0.5/9.5 mcg/mL TAMIKA Final    Tetracycline Sensitive <=4 mcg/mL TAMIKA Final              Staphylococcus pettenkoferi (5)       Antibiotic Interpretation Microscan   Method Status    Clindamycin Sensitive <=0.25 mcg/mL TAMIKA Final    Cefazolin Sensitive <=8 mcg/mL TAMIKA Final    Cefepime Sensitive <=4 mcg/mL TAMIKA Final    Daptomycin Sensitive <=0.5 mcg/mL TAMIKA Final     Erythromycin Sensitive <=0.25 mcg/mL TAMIKA Final    Ampicillin/sulbactam Sensitive <=8/4 mcg/mL TAMIKA Final    Vancomycin Sensitive 1 mcg/mL TAMIKA Final    Oxacillin Sensitive <=0.25 mcg/mL TAMIKA Final    Trimeth/Sulfa Sensitive <=0.5/9.5 mcg/mL TAMIKA Final    Tetracycline Sensitive <=4 mcg/mL TAMIKA Final                       CULTURE TISSUE W/ GRM STAIN [[id number]]  (Abnormal)  (Susceptibility) Collected: 12/21/23 6629    Order Status: Completed Specimen: Tissue Updated: 12/26/23 1046     Significant Indicator POS     Source TISS     Site right foot tissue     Culture Result -     Gram Stain Result Rare WBCs.  Many Gram positive cocci.  Few Gram negative rods.       Culture Result Actinomyces turicensis  Moderate growth        Streptococcus agalactiae (Group B)  Light growth        Staphylococcus aureus  Light growth        Staphylococcus epidermidis  Light growth        Staphylococcus pettenkoferi  Light growth  See previous culture for sensitivity report.      Narrative:      1 - Foot tissue - Right foot tissue for culture  Surgery Specimen    Susceptibility       Staphylococcus aureus (3)       Antibiotic Interpretation Microscan   Method Status    Clindamycin Sensitive <=0.25 mcg/mL TAMIKA Final    Cefazolin Sensitive <=8 mcg/mL TAMIKA Final    Cefepime Sensitive <=4 mcg/mL TAMIKA Final    Daptomycin Sensitive <=0.5 mcg/mL TAMIKA Final    Ampicillin/sulbactam Sensitive <=8/4 mcg/mL TAMIKA Final    Erythromycin Sensitive <=0.25 mcg/mL TAMIKA Final    Vancomycin Sensitive 1 mcg/mL TAMIKA Final    Oxacillin Sensitive 0.5 mcg/mL TAMIKA Final    Trimeth/Sulfa Sensitive <=0.5/9.5 mcg/mL TAMIKA Final    Tetracycline Sensitive <=4 mcg/mL TAMIKA Final              Staphylococcus epidermidis (4)       Antibiotic Interpretation Microscan   Method Status    Clindamycin Resistant >4 mcg/mL TAMIKA Final    Cefazolin Sensitive <=8 mcg/mL TAMIKA Final    Cefepime Sensitive <=4 mcg/mL TAMIKA Final    Daptomycin Sensitive 1 mcg/mL TAMIKA Final    Ampicillin/sulbactam Sensitive  "<=8/4 mcg/mL TAMIKA Final    Erythromycin Resistant >4 mcg/mL TAMIKA Final    Vancomycin Sensitive 2 mcg/mL TAMIKA Final    Oxacillin Sensitive <=0.25 mcg/mL TAMIKA Final    Trimeth/Sulfa Sensitive <=0.5/9.5 mcg/mL TAMIKA Final    Tetracycline Sensitive <=4 mcg/mL TAMIKA Final                       Fungal Culture [[id number]] Collected: 12/21/23 1907    Order Status: Completed Specimen: Tissue Updated: 12/26/23 1046     Significant Indicator NEG     Source TISS     Site right foot tissue     Culture Result Culture in progress.     Fungal Smear Results No fungal elements seen.    Narrative:      1 - Foot tissue - Right foot tissue for culture  Surgery Specimen    Anaerobic Culture [[id number]]  (Abnormal) Collected: 12/21/23 1907    Order Status: Completed Specimen: Tissue Updated: 12/26/23 1046     Significant Indicator POS     Source TISS     Site right foot tissue     Culture Result -      Prevotella sp.  Moderate growth  Prevotella bergensis  Organism identified by [employer] research use only library.        Fusobacterium canifelinum  Rare growth      Narrative:      1 - Foot tissue - Right foot tissue for culture  Surgery Specimen    BLOOD CULTURE [[id number]]  (Abnormal) Collected: 12/21/23 1150    Order Status: Completed Specimen: Blood from Peripheral Updated: 12/24/23 1629     Significant Indicator POS     Source BLD     Site PERIPHERAL     Culture Result Growth detected by Bactec instrument. 12/23/2023  05:30      Actinomyces turicensis  Possible contaminant.  Isolated from one set only, please correlate with clinical  condition. Contact the Microbiology department within 48 hr  if identification and susceptibility are needed.      Narrative:      CALL  [facility] tel. [phone],  CALLED  MED tel. [phone] 12/23/2023, 05:33, RB PERF. RESULTS CALLED TO:  [id number], RN  Per Hospital Policy: Only change Specimen Src: to \"Line\" if  specified by physician order.  Right AC    GRAM STAIN [[id number]] Collected: 12/21/23 1907    Order " Status: Completed Specimen: Tissue Updated: 12/23/23 1254     Significant Indicator .     Source TISS     Site right foot tissue     Gram Stain Result Rare WBCs.  Many Gram positive cocci.  Few Gram negative rods.      Narrative:      1 - Foot tissue - Right foot tissue for culture  Surgery Specimen    Fungal Smear [[id number]] Collected: 12/21/23 1907    Order Status: Completed Specimen: Tissue Updated: 12/23/23 1254     Significant Indicator NEG     Source TISS     Site right foot tissue     Fungal Smear Results No fungal elements seen.    Narrative:      1 - Foot tissue - Right foot tissue for culture  Surgery Specimen    CULTURE WOUND W/ GRAM STAIN [[id number]]  (Abnormal) Collected: 12/21/23 1228    Order Status: Completed Specimen: Wound from Right Foot Updated: 12/23/23 1100     Significant Indicator POS     Source WND     Site RIGHT FOOT     Culture Result Moderate growth usual skin alec.     Gram Stain Result Rare WBCs.  Many Gram positive cocci.  Few Gram negative rods.       Culture Result Pasteurella species  Light growth  Pasteurella dagmatis        Streptococcus agalactiae (Group B)  Rare growth      Narrative:      CALL  [facility] tel. [phone],  CALLED  MED tel. [phone] 12/22/2023, 16:44, RB PERF. RESULTS CALLED  TO:Bob    BLOOD CULTURE [[id number]] Collected: 12/21/23 1228    Order Status: Completed Specimen: Blood from Peripheral Updated: 12/22/23 0732     Significant Indicator NEG     Source BLD     Site PERIPHERAL     Culture Result No Growth  Note: Blood cultures are incubated for 5 days and  are monitored continuously.Positive blood cultures  are called to the RN and reported as soon as  they are identified.  Blood culture testing and Gram stain, if indicated, are  performed at [facility], [address] [place] [address].  Positive blood cultures are  sent to [facility], [address] [place] [address], for organism identification  "and  susceptibility testing.      Narrative:      Per Hospital Policy: Only change Specimen Src: to \"Line\" if  specified by physician order.  Left AC    GRAM STAIN [[id number]] Collected: 12/21/23 1228    Order Status: Completed Specimen: Wound Updated: 12/21/23 1923     Significant Indicator .     Source WND     Site RIGHT FOOT     Gram Stain Result Rare WBCs.  Many Gram positive cocci.  Few Gram negative rods.      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]     Hyponatremia [E87.1]     SRIRAM (acute kidney injury) ([facility]) [N17.9]     Type 2 diabetes mellitus with diabetic peripheral angiopathy and gangrene, without long-term current use of insulin (HCC) [E11.52]     Fracture of the fifth metatarsal [T14.8XXA]     Acute hematogenous osteomyelitis of right foot ([facility]) [M86.071]     Anemia [D64.9]     Dehydration [E86.0]     Cellulitis of right lower extremity [L03.115]     Abnormal EKG [R94.31]      Poorly controlled DM_Hgb A1C 9.7  Gangrene right foot-polymicrobial  -Per ER note he thought he was bitten by a spider  -Pictures reviewed in media-findings are not cw spider bite: Gerardo gangrene and full-thickness necrosis of the skin extending from the dorsum of the foot over the lateral aspect of the midfoot, forefoot and 4th toe   -Culture results are also not cw spider bite  -Positive blood culture likely secondary to gangrene, likely anaerobe or Actino        PLAN:   Tetanus updated 12/21  Aware that antibiotics will not cure this infection-he presented too late with extensive tissue necrosis and gangrene necessitating extensive debridement resulting in significant tissue " loss  Plan for BKA  Blood culture 1 of 2 Actino  Repeat blood cultures today  Continue Zosyn for now given +blood culture  Keep BS under 150 to help control current infection  Final antibiotic recommendations per culture results and clinical course

## 2023-12-26 NOTE — PROGRESS NOTES
Received report from Nemesio FORBES. Patient awake, alert, in bed, oriented x4 and denies pain at this time. Patient encouraged to report pain and/or discomfort. Verbalizes understanding.  Bed is locked and in lowest position. Call light within reach.

## 2023-12-26 NOTE — PROGRESS NOTES
Telemetry Shift Summary    Rhythm SR  HR Range 75-84  Ectopy rPAC/PVC  Measurements 0.16/0.08/0.34    Normal Values  Rhythm SR  HR Range:   Measurements: 0.12-0.20/0.06-0.10/0.30-0.52

## 2023-12-26 NOTE — CARE PLAN
The patient is Stable - Low risk of patient condition declining or worsening    Shift Goals  Clinical Goals: Monitor s/s of infection, manage mpain  Patient Goals: Get mobile again and be out of hospital    Progress made toward(s) clinical / shift goals:    Problem: Pain - Standard  Goal: Alleviation of pain or a reduction in pain to the patient’s comfort goal  Outcome: Progressing  Note: Patient denies pain during shift. Educated on interventions available. Encouraged to voice discomfort and/or pain.      Problem: Knowledge Deficit - Standard  Goal: Patient and family/care givers will demonstrate understanding of plan of care, disease process/condition, diagnostic tests and medications  Outcome: Progressing  Note: IV abx, IV fluids, and medications explained during shift. Pharmacological and non-pharmacological interventions explained during shift.      Problem: Infection - Diabetes  Goal: Patient will remain free from signs and symptoms of infection  Outcome: Progressing  Note: Patient afebrile during shift. Denies chills. Lung sounds clear.        Patient is not progressing towards the following goals:

## 2023-12-26 NOTE — CARE PLAN
Problem: Pain - Standard  Goal: Alleviation of pain or a reduction in pain to the patient’s comfort goal  Outcome: Progressing  Note: Pain control as per MAR     Problem: Fall Risk  Goal: Patient will remain free from falls  Outcome: Progressing  Note: Fall prevention measures in place   The patient is Stable - Low risk of patient condition declining or worsening    Shift Goals  Clinical Goals: pain control,Abx,IV fluids,wound vac output  Patient Goals: rest,sleep    Progress made toward(s) clinical / shift goals:  Educated on fall prevention measures,encouraged use of call light,updated on plan of care,  Denies pain during initial rounds  Patient is not progressing towards the following goals:

## 2023-12-26 NOTE — CARE PLAN
The patient is Stable - Low risk of patient condition declining or worsening    Shift Goals  Clinical Goals: Antibiotics, maintain wound vac and NPO status  Patient Goals: Get surgery done to be able to go home    Progress made toward(s) clinical / shift goals:    Problem: Pain - Standard  Goal: Alleviation of pain or a reduction in pain to the patient’s comfort goal  Outcome: Progressing  Note: Discussed pharmacological and non pharmacological interventions for pain during shift and educated patient on options, side effects, and indications.     Problem: Knowledge Deficit - Standard  Goal: Patient and family/care givers will demonstrate understanding of plan of care, disease process/condition, diagnostic tests and medications  Outcome: Progressing  Note: Reviewed and discussed POC with patient and new NPO status for surgery later this afternoon.        Patient is not progressing towards the following goals:

## 2023-12-26 NOTE — PROGRESS NOTES
S: Pain controlled    O: wound vac holding suction    A/P  48-year-old male with uncontrolled diabetes with right necrotic forefoot infection status postdebridement.  I again discussed with him that due to the amount of soft tissue loss with necrosis he is likely best served by bony amputation for infection eradication and return to normal level of function.  We again discussed possible limb salvage but I think this will be a long difficult road for him due to the amount of infection and soft tissue loss in the foot.  After the respective discussion he is amenable to amputation if we will plan for a bony amputation this afternoon.  Please maintain n.p.o.

## 2023-12-26 NOTE — ANESTHESIA PREPROCEDURE EVALUATION
Case: [id number] Date/Time: 12/26/23 1435    Procedure: AMPUTATION, BELOW KNEE (Right)    Location:  OR  / SURGERY [facility]    Surgeons: Marco Antonio Kang M.D.            Relevant Problems   CARDIAC   (positive) Type 2 diabetes mellitus with diabetic peripheral angiopathy and gangrene, without long-term current use of insulin (HCC)         (positive) SRIRAM (acute kidney injury) (HCC)      ENDO   (positive) Type 2 diabetes mellitus with diabetic peripheral angiopathy and gangrene, without long-term current use of insulin (HCC)      Other   (positive) Acute hematogenous osteomyelitis of right foot (HCC)       Physical Exam    Airway   Mallampati: II  TM distance: >3 FB  Neck ROM: full       Cardiovascular - normal exam  Rhythm: regular  Rate: normal  (-) murmur     Dental - normal exam           Pulmonary - normal exam  Breath sounds clear to auscultation     Abdominal    Neurological - normal exam                   Anesthesia Plan    ASA 3- EMERGENT   ASA physical status 3 criteria: diabetes - poorly controlledASA physical status emergent criteria: acute ischemia (limb, body part, tissue)    Plan - general       Airway plan will be ETT          Induction: intravenous    Postoperative Plan: Postoperative administration of opioids is intended.    Pertinent diagnostic labs and testing reviewed    Informed Consent:    Anesthetic plan and risks discussed with patient.    Use of blood products discussed with: patient whom consented to blood products.

## 2023-12-26 NOTE — PROGRESS NOTES
Received report from Nemesio FORBES. Patient in bed, awake, alert and oriented x4. Patient denies pain.  MD Kang plan for surgery this afternoon. New diet order status NPO. Patient denies pain at this time. Answered patient questions, no other needs at this time. Bed is locked and in lowest position. Call light within reach.

## 2023-12-26 NOTE — DISCHARGE PLANNING
Case Management Discharge Planning    Admission Date: 12/21/2023  GMLOS: 5.2  ALOS: 5    6-Clicks ADL Score: 21  6-Clicks Mobility Score: 18      Anticipated Discharge Dispo: Discharge Disposition: D/T to SNF with Medicare cert in anticipation of skilled care (03)    DME Needed: No    Action(s) Taken: OTHER    Escalations Completed: None    Medically Clear: No    Barriers to Discharge: Medical clearance, No Insurance    Course of Action  **1045  Patient discussed during IDT rounds. Patient pending amputation procedure later today. Patient will likely benefit from post-acute placement. LSW following for PT/OT recommendations following surgery. Of importance, patient is uninsured. If recommended, patient may be a candidate for a leased bed and [facility]'s partnered facilities.     **1140  Created [facility]: [id number]

## 2023-12-26 NOTE — PROGRESS NOTES
[facility] Medicine Daily Progress Note    Date of Service  12/26/2023    Chief Complaint  Nestor Mckenzie Jr. is a 48 y.o. male admitted 12/21/2023 with black lesion on his right foot    Hospital Course  Patient is a 48-year-old male with diabetes who presented with black discoloration of the right toe with pain, redness and swelling.  Been doing well until 4 days prior when he thought he felt a spider bite on his right foot and since that time has had worsening pain redness with swelling.  He presented to the emergency room and had acute kidney injury, hyponatremia and elevated lactic acid consistent with sepsis.  Orthopedic surgery was consulted given the extensive nature of the necrosis.  He had large amount of debridement on 12/22 and tolerated the procedure well.  At this time patient is hoping to preserve as much of his foot as possible and is not interested in BKA unless it comes to know other options.    Interval Problem Update  12/23 patient is postop day 1 from extensive debridement of the right foot with wound VAC placement.  Patient states that he wants to save as much of his foot as possible and is not interested in BKA unless he tries all other options and is not prove successful.  12/24: Patient is slightly more open to amputation after discussion with infectious disease.  Given the large amount of tissue exposed with the dissection of the gangrene likely the only viable option to treat infection will be a below the knee amputation.  Patient still making ultimate decision as he not has not committed one way or the other.  12/25: Patient doing about the same.  He is much more agreeable to proceed with amputation at this time after discussion with infectious disease yesterday.  Will continue with Zosyn per ID recommendations and Zyvox discontinued.  Sodium and creatinine stable.  Anticipate discussion with orthopedic surgery tomorrow.  12/26: Patient agreeable with moving ahead with the amputation,  having mixed feelings but understands that he does need to move forward with this.  Pain under good control.  Continue with antibiotics per ID recommendations.    I have discussed this patient's plan of care and discharge plan at IDT rounds today with Case Management, Nursing, Nursing leadership, and other members of the IDT team.    Consultants/Specialty  orthopedics    Code Status  Full Code    Disposition  The patient is not medically cleared for discharge to home or a post-acute facility.  Anticipate discharge to: home with close outpatient follow-up    I have placed the appropriate orders for post-discharge needs.    Review of Systems  Review of Systems   Constitutional:  Negative for chills and fever.   HENT:  Negative for congestion.    Eyes:  Negative for blurred vision and photophobia.   Respiratory:  Negative for cough and shortness of breath.    Cardiovascular:  Negative for chest pain, claudication and leg swelling.   Gastrointestinal:  Negative for abdominal pain, constipation, diarrhea, heartburn and vomiting.   Genitourinary:  Negative for dysuria and hematuria.   Musculoskeletal:  Negative for joint pain and myalgias.   Skin:  Negative for itching and rash.   Neurological:  Negative for dizziness, sensory change, speech change, weakness and headaches.   Psychiatric/Behavioral:  Negative for depression. The patient is not nervous/anxious and does not have insomnia.         Physical Exam  Temp:  [36.3 °C (97.3 °F)-36.9 °C (98.5 °F)] 36.9 °C (98.5 °F)  Pulse:  [81-91] 87  Resp:  [18-22] 18  BP: (100-166)/(50-89) 166/88  SpO2:  [93 %-97 %] 94 %    Physical Exam  Vitals and nursing note reviewed.   Constitutional:       General: He is not in acute distress.     Appearance: Normal appearance.   HENT:      Head: Normocephalic and atraumatic.   Eyes:      General: No scleral icterus.     Extraocular Movements: Extraocular movements intact.   Cardiovascular:      Rate and Rhythm: Normal rate and regular  rhythm.      Pulses: Normal pulses.      Heart sounds: Normal heart sounds. No murmur heard.  Pulmonary:      Effort: Pulmonary effort is normal. No respiratory distress.      Breath sounds: Normal breath sounds. No wheezing, rhonchi or rales.   Abdominal:      General: Abdomen is flat. Bowel sounds are normal. There is no distension.      Palpations: Abdomen is soft.      Tenderness: There is no rebound.   Musculoskeletal:         General: No swelling or tenderness.      Cervical back: Normal range of motion and neck supple.   Lymphadenopathy:      Cervical: No cervical adenopathy.   Skin:     Coloration: Skin is not jaundiced.      Findings: No erythema.      Comments: Right foot dressed and wound VAC in place, patient able to move toes, sensation intact  Wound pictures reviewed   Neurological:      General: No focal deficit present.      Mental Status: He is alert and oriented to person, place, and time. Mental status is at baseline.      Cranial Nerves: No cranial nerve deficit.   Psychiatric:         Mood and Affect: Mood normal.         Behavior: Behavior normal.         Fluids    Intake/Output Summary (Last 24 hours) at 12/26/2023 1309  Last data filed at 12/26/2023 1036  Gross per 24 hour   Intake 240 ml   Output 2525 ml   Net -2285 ml         Laboratory  Recent Labs     12/24/23  0109 12/25/23  0032 12/26/23  0024   WBC 9.4 9.1 8.3   RBC 3.32* 3.46* 3.49*   HEMOGLOBIN 9.3* 10.0* 10.0*   HEMATOCRIT 28.7* 30.0* 30.0*   MCV 86.4 86.7 86.0   MCH 28.0 28.9 28.7   MCHC 32.4 33.3 33.3   RDW 38.5 38.0 37.0   PLATELETCT 274 293 293   MPV 9.7 9.4 9.3       Recent Labs     12/24/23  0109 12/25/23  0032 12/26/23  0024   SODIUM 127* 131* 131*   POTASSIUM 4.2 4.0 3.8   CHLORIDE 99 98 97   CO2 20 26 26   GLUCOSE 232* 172* 155*   BUN 12 10 10   CREATININE 1.24 1.27 1.20   CALCIUM 7.1* 7.3* 7.3*                     Imaging  DX-FOOT-COMPLETE 3+ RIGHT   Final Result      1.  Acute fracture of the base of the fifth metatarsal.  "     2.  Extensive soft tissue gas involving the mid and forefoot bilaterally likely representing tissue necrosis/gangrene.      3.  Demineralization of the fifth metatarsal possibly representing presence of osteomyelitis.      DX-CHEST-PORTABLE (1 VIEW)   Final Result      No evidence of acute cardiopulmonary process.           Assessment/Plan  * Sepsis (HCC)- (present on admission)  Assessment & Plan  Sepsis criteria has resolved      Hypomagnesemia  Assessment & Plan  Replace as needed  monitor    Leukocytosis  Assessment & Plan  In the setting of infection  Monitor  Continue antibiotics.    Abnormal EKG- (present on admission)  Assessment & Plan  Patient states he feels well  No need for echo    Cellulitis of right lower extremity- (present on admission)  Assessment & Plan  I will start Zosyn and Zyvox, follow on cultures and sensitivities  There is extensive soft tissue gas concerning for gangrene and necrosis  Orthopedics consulted     12/22: Patient underwent: \"Right foot irrigation and excisional debridement down to bone, Right foot toe amputation of the fourth and fifth toes at the metatarsal phalangeal joint, Right foot wound VAC placement\" by orthopedic surgery yesterday.  Orthopedic surgery does not believe that the limb is salvageable and they are planning on potentially doing a BKA but this will not happen until most likely early next week.  Orthopedic surgery to discuss this with patient today.  Will continue with antibiotics for now.  Pending cultures.  Once we have any culture results we will consult ID.  For now we will continue with Zyvox and Zosyn.    12/24: Preoperative wound positive for Pasteurella and group B strep, per ID continue with Zosyn, appreciate consultation  12/25: Continue with Zosyn, patient will need further amputation  12/26: Patient for BKA today with Dr. Kang    Dehydration- (present on admission)  Assessment & Plan  Likely secondary to reduced oral intake, and increase in " "insensible loss due to infection.  Encourage oral intake as tolerated, antiemetics as needed.  Intravenous hydration until adequate oral intake is achieved.     Anemia- (present on admission)  Assessment & Plan  There could be an acute blood loss component due to recent surgery  Monitor for any signs of bleeding      Acute hematogenous osteomyelitis of right foot (HCC)- (present on admission)  Assessment & Plan  Continue Zosyn  Appreciate ID recommendations  Patient agreeable to proceed with BKA,    Fracture of the fifth metatarsal- (present on admission)  Assessment & Plan  Orthopedics consulted     Type 2 diabetes mellitus with diabetic peripheral angiopathy and gangrene, without long-term current use of insulin (HCC)- (present on admission)  Assessment & Plan  With hyperglycemia  I will check a glycated hemoglobin    I will start short acting insulin for now  I will order Accu-Checks, hypoglycemia protocol  Adjust according to blood sugars trend.     Sugars under control    SRIRAM (acute kidney injury) (HCC)- (present on admission)  Assessment & Plan  Creatinine back to baseline 1.27    Hyponatremia- (present on admission)  Assessment & Plan  Seems to be improving  Continue with IVF for now    Gangrene associated with diabetes mellitus (HCC)- (present on admission)  Assessment & Plan        12/22: Patient underwent: \"Right foot irrigation and excisional debridement down to bone, Right foot toe amputation of the fourth and fifth toes at the metatarsal phalangeal joint, Right foot wound VAC placement\" by orthopedic surgery yesterday.  Orthopedic surgery does not believe that the limb is salvageable and they are planning on potentially doing a BKA but this will not happen until most likely early next week.  Orthopedic surgery to discuss this with patient today.  Will continue with antibiotics for now.  Pending cultures.  Once we have any culture results we will consult ID.  For now we will continue with Zyvox and " Zosyn.    12/24: Patient now more amenable to the possibility of amputation as this will likely be the only viable option for recovery based on length of antibiotic and as also the significant debridement that was necessary to dissect the gangrene.  Wound VAC in place and under this tendon and bone exposed.  Discuss further with orthopedic surgery    12/25: Patient in good spirits, he has come to terms with moving forward with amputation and does feel that this will give him the best opportunity for independence and resolution of infection.  Will discuss further with orthopedic surgery likely tomorrow.    12/26: Patient for BKA later today around 2, Dr Kang         VTE prophylaxis:   SCDs/TEDs      I have performed a physical exam and reviewed and updated ROS and Plan today (12/26/2023). In review of yesterday's note (12/25/2023), there are no changes except as documented above.

## 2023-12-27 PROBLEM — Z89.511 STATUS POST BELOW-KNEE AMPUTATION OF RIGHT LOWER EXTREMITY (HCC): Status: ACTIVE | Noted: 2023-12-27

## 2023-12-27 PROBLEM — R78.81 BACTEREMIA: Status: ACTIVE | Noted: 2023-12-27

## 2023-12-27 LAB
ANION GAP SERPL CALC-SCNC: 10 MMOL/L (ref 7–16)
BUN SERPL-MCNC: 11 MG/DL (ref 8–22)
CALCIUM SERPL-MCNC: 7.6 MG/DL (ref 8.4–10.2)
CHLORIDE SERPL-SCNC: 98 MMOL/L (ref 96–112)
CO2 SERPL-SCNC: 26 MMOL/L (ref 20–33)
CREAT SERPL-MCNC: 1.19 MG/DL (ref 0.5–1.4)
ERYTHROCYTE [DISTWIDTH] IN BLOOD BY AUTOMATED COUNT: 37.8 FL (ref 35.9–50)
GFR SERPLBLD CREATININE-BSD FMLA CKD-EPI: 75 ML/MIN/1.73 M 2
GLUCOSE BLD STRIP.AUTO-MCNC: 160 MG/DL (ref 65–99)
GLUCOSE BLD STRIP.AUTO-MCNC: 192 MG/DL (ref 65–99)
GLUCOSE BLD STRIP.AUTO-MCNC: 234 MG/DL (ref 65–99)
GLUCOSE BLD STRIP.AUTO-MCNC: 236 MG/DL (ref 65–99)
GLUCOSE SERPL-MCNC: 120 MG/DL (ref 65–99)
HCT VFR BLD AUTO: 32 % (ref 42–52)
HGB BLD-MCNC: 10.4 G/DL (ref 14–18)
MCH RBC QN AUTO: 28.4 PG (ref 27–33)
MCHC RBC AUTO-ENTMCNC: 32.5 G/DL (ref 32.3–36.5)
MCV RBC AUTO: 87.4 FL (ref 81.4–97.8)
PLATELET # BLD AUTO: 317 K/UL (ref 164–446)
PMV BLD AUTO: 9 FL (ref 9–12.9)
POTASSIUM SERPL-SCNC: 4.2 MMOL/L (ref 3.6–5.5)
RBC # BLD AUTO: 3.66 M/UL (ref 4.7–6.1)
SODIUM SERPL-SCNC: 134 MMOL/L (ref 135–145)
WBC # BLD AUTO: 9.3 K/UL (ref 4.8–10.8)

## 2023-12-27 PROCEDURE — 700111 HCHG RX REV CODE 636 W/ 250 OVERRIDE (IP): Mod: JZ | Performed by: HOSPITALIST

## 2023-12-27 PROCEDURE — 700111 HCHG RX REV CODE 636 W/ 250 OVERRIDE (IP): Mod: JZ | Performed by: INTERNAL MEDICINE

## 2023-12-27 PROCEDURE — 36415 COLL VENOUS BLD VENIPUNCTURE: CPT

## 2023-12-27 PROCEDURE — 700105 HCHG RX REV CODE 258: Performed by: INTERNAL MEDICINE

## 2023-12-27 PROCEDURE — 94760 N-INVAS EAR/PLS OXIMETRY 1: CPT

## 2023-12-27 PROCEDURE — 85027 COMPLETE CBC AUTOMATED: CPT

## 2023-12-27 PROCEDURE — 700102 HCHG RX REV CODE 250 W/ 637 OVERRIDE(OP): Performed by: HOSPITALIST

## 2023-12-27 PROCEDURE — 87040 BLOOD CULTURE FOR BACTERIA: CPT

## 2023-12-27 PROCEDURE — 770006 HCHG ROOM/CARE - MED/SURG/GYN SEMI*

## 2023-12-27 PROCEDURE — 82962 GLUCOSE BLOOD TEST: CPT | Mod: 91

## 2023-12-27 PROCEDURE — 80048 BASIC METABOLIC PNL TOTAL CA: CPT

## 2023-12-27 PROCEDURE — A9270 NON-COVERED ITEM OR SERVICE: HCPCS | Performed by: HOSPITALIST

## 2023-12-27 PROCEDURE — 97164 PT RE-EVAL EST PLAN CARE: CPT

## 2023-12-27 PROCEDURE — 99232 SBSQ HOSP IP/OBS MODERATE 35: CPT | Performed by: INTERNAL MEDICINE

## 2023-12-27 PROCEDURE — 700111 HCHG RX REV CODE 636 W/ 250 OVERRIDE (IP): Performed by: INTERNAL MEDICINE

## 2023-12-27 RX ORDER — OXYCODONE HYDROCHLORIDE 5 MG/1
5 TABLET ORAL
Status: DISCONTINUED | OUTPATIENT
Start: 2023-12-27 | End: 2023-12-29 | Stop reason: HOSPADM

## 2023-12-27 RX ORDER — HYDROMORPHONE HYDROCHLORIDE 1 MG/ML
0.5 INJECTION, SOLUTION INTRAMUSCULAR; INTRAVENOUS; SUBCUTANEOUS
Status: DISCONTINUED | OUTPATIENT
Start: 2023-12-27 | End: 2023-12-29 | Stop reason: HOSPADM

## 2023-12-27 RX ORDER — OXYCODONE HYDROCHLORIDE 10 MG/1
10 TABLET ORAL
Status: DISCONTINUED | OUTPATIENT
Start: 2023-12-27 | End: 2023-12-29 | Stop reason: HOSPADM

## 2023-12-27 RX ADMIN — PIPERACILLIN AND TAZOBACTAM 4.5 G: 4; .5 INJECTION, POWDER, FOR SOLUTION INTRAVENOUS at 10:28

## 2023-12-27 RX ADMIN — OXYCODONE HYDROCHLORIDE 10 MG: 10 TABLET ORAL at 19:49

## 2023-12-27 RX ADMIN — OXYCODONE HYDROCHLORIDE 5 MG: 5 TABLET ORAL at 00:19

## 2023-12-27 RX ADMIN — HYDROMORPHONE HYDROCHLORIDE 0.5 MG: 1 INJECTION, SOLUTION INTRAMUSCULAR; INTRAVENOUS; SUBCUTANEOUS at 12:28

## 2023-12-27 RX ADMIN — INSULIN HUMAN 2 UNITS: 100 INJECTION, SOLUTION PARENTERAL at 17:50

## 2023-12-27 RX ADMIN — INSULIN HUMAN 1 UNITS: 100 INJECTION, SOLUTION PARENTERAL at 06:36

## 2023-12-27 RX ADMIN — OXYCODONE HYDROCHLORIDE 10 MG: 10 TABLET ORAL at 10:24

## 2023-12-27 RX ADMIN — ACETAMINOPHEN 650 MG: 325 TABLET ORAL at 10:24

## 2023-12-27 RX ADMIN — HYDROMORPHONE HYDROCHLORIDE 0.5 MG: 1 INJECTION, SOLUTION INTRAMUSCULAR; INTRAVENOUS; SUBCUTANEOUS at 08:27

## 2023-12-27 RX ADMIN — HYDROMORPHONE HYDROCHLORIDE 0.25 MG: 1 INJECTION, SOLUTION INTRAMUSCULAR; INTRAVENOUS; SUBCUTANEOUS at 01:34

## 2023-12-27 RX ADMIN — HYDROMORPHONE HYDROCHLORIDE 0.5 MG: 1 INJECTION, SOLUTION INTRAMUSCULAR; INTRAVENOUS; SUBCUTANEOUS at 04:11

## 2023-12-27 RX ADMIN — PIPERACILLIN AND TAZOBACTAM 4.5 G: 4; .5 INJECTION, POWDER, FOR SOLUTION INTRAVENOUS at 01:38

## 2023-12-27 RX ADMIN — INSULIN HUMAN 1 UNITS: 100 INJECTION, SOLUTION PARENTERAL at 21:15

## 2023-12-27 RX ADMIN — AMPICILLIN AND SULBACTAM 3 G: 1; 2 INJECTION, POWDER, FOR SOLUTION INTRAMUSCULAR; INTRAVENOUS at 12:37

## 2023-12-27 RX ADMIN — AMPICILLIN AND SULBACTAM 3 G: 1; 2 INJECTION, POWDER, FOR SOLUTION INTRAMUSCULAR; INTRAVENOUS at 17:57

## 2023-12-27 RX ADMIN — HYDROMORPHONE HYDROCHLORIDE 0.5 MG: 1 INJECTION, SOLUTION INTRAMUSCULAR; INTRAVENOUS; SUBCUTANEOUS at 21:37

## 2023-12-27 RX ADMIN — INSULIN HUMAN 2 UNITS: 100 INJECTION, SOLUTION PARENTERAL at 12:40

## 2023-12-27 ASSESSMENT — ENCOUNTER SYMPTOMS
DIZZINESS: 0
CHILLS: 0
DIARRHEA: 0
PHOTOPHOBIA: 0
SENSORY CHANGE: 0
ABDOMINAL PAIN: 0
COUGH: 0
DEPRESSION: 0
CLAUDICATION: 0
BLURRED VISION: 0
SHORTNESS OF BREATH: 0
NERVOUS/ANXIOUS: 0
VOMITING: 0
CONSTIPATION: 0
INSOMNIA: 0
HEADACHES: 0
SPEECH CHANGE: 0
WEAKNESS: 0
FEVER: 0
MYALGIAS: 0
HEARTBURN: 0

## 2023-12-27 ASSESSMENT — COGNITIVE AND FUNCTIONAL STATUS - GENERAL
CLIMB 3 TO 5 STEPS WITH RAILING: TOTAL
STANDING UP FROM CHAIR USING ARMS: A LITTLE
MOVING FROM LYING ON BACK TO SITTING ON SIDE OF FLAT BED: A LITTLE
TURNING FROM BACK TO SIDE WHILE IN FLAT BAD: A LITTLE
MOVING TO AND FROM BED TO CHAIR: A LITTLE
MOBILITY SCORE: 15
SUGGESTED CMS G CODE MODIFIER MOBILITY: CK
WALKING IN HOSPITAL ROOM: A LOT

## 2023-12-27 ASSESSMENT — FIBROSIS 4 INDEX: FIB4 SCORE: 0.6

## 2023-12-27 ASSESSMENT — GAIT ASSESSMENTS: GAIT LEVEL OF ASSIST: UNABLE TO PARTICIPATE

## 2023-12-27 ASSESSMENT — PAIN DESCRIPTION - PAIN TYPE
TYPE: PHANTOM PAIN;SURGICAL PAIN
TYPE: ACUTE PAIN;SURGICAL PAIN

## 2023-12-27 NOTE — CARE PLAN
Problem: Pain - Standard  Goal: Alleviation of pain or a reduction in pain to the patient’s comfort goal  Outcome: Progressing     Problem: Knowledge Deficit - Standard  Goal: Patient and family/care givers will demonstrate understanding of plan of care, disease process/condition, diagnostic tests and medications  Outcome: Progressing     Problem: Hemodynamics  Goal: Patient's hemodynamics, fluid balance and neurologic status will be stable or improve  Outcome: Progressing     Problem: Fluid Volume  Goal: Fluid volume balance will be maintained  Outcome: Progressing     Problem: Urinary - Renal Perfusion  Goal: Ability to achieve and maintain adequate renal perfusion and functioning will improve  Outcome: Progressing     Problem: Respiratory  Goal: Patient will achieve/maintain optimum respiratory ventilation and gas exchange  Outcome: Progressing     Problem: Mechanical Ventilation  Goal: Safe management of artificial airway and ventilation  Outcome: Progressing  Goal: Successful weaning off mechanical ventilator, spontaneously maintains adequate gas exchange  Outcome: Progressing  Goal: Patient will be able to express needs and understand communication  Outcome: Progressing     Problem: Physical Regulation  Goal: Diagnostic test results will improve  Outcome: Progressing  Goal: Signs and symptoms of infection will decrease  Outcome: Progressing     Problem: Diabetes Management  Goal: Patient will achieve and maintain glucose in satisfactory range  Outcome: Progressing     Problem: Knowledge Deficit - Diabetes  Goal: Patient will demonstrate knowledge of insulin injection, symptoms, and treatment of hypoglycemia and diet prior to discharge  Outcome: Progressing     Problem: Discharge Planning - Diabetes  Goal: Patient's continuum of care needs will be met  Outcome: Progressing     Problem: Skin Integrity - Diabetes  Goal: Patient's skin on legs and feet will remain intact while hospitalized  Outcome:  Progressing     Problem: Infection - Diabetes  Goal: Patient will remain free from signs and symptoms of infection  Outcome: Progressing  Goal: Promotion wound healing, line and drain management  Outcome: Progressing     Problem: Fluid Balance or Risk for Fluid Volume Deficit  Goal: Patient will demonstrate adequate hydration and vital signs  Outcome: Progressing     Problem: Nutrition Deficit - Diabetes  Goal: Patient will demonstrate adequate hydration and vital signs  Outcome: Progressing     Problem: Diabetic Ulcer  Goal: Early identification of diabetic foot wound and initiation of appropriate interventions  Outcome: Progressing     Problem: Fall Risk  Goal: Patient will remain free from falls  Outcome: Progressing   The patient is Stable - Low risk of patient condition declining or worsening    Shift Goals  Clinical Goals: stump, protector and pain managemnet  Patient Goals: PT/OT, moving    Progress made toward(s) clinical / shift goals:  fitted with stump protector, pain managable    Patient is not progressing towards the following goals: hasn't ambulated yet

## 2023-12-27 NOTE — ASSESSMENT & PLAN NOTE
Postop day 1 from right sided BKA,  Dr. Kang continues to consult  Stump protector to be fitted today

## 2023-12-27 NOTE — THERAPY
Physical Therapy   Re-evaluation    Patient Name: Nestor Mckenzie Jr.  Age:  48 y.o., Sex:  male  Medical Record #: [id number]  Today's Date: 12/27/2023     Precautions  Precautions: Fall Risk    Assessment    Pt is POD#1 R BKA. Overall pts pain is controlled and pt was able to stand ~2 min with FWW CGA. No LOB. Reviewed with pt recovery from BKA and proper positioning. Pt states plans to DC home alone in 3rd floor apt with 2 flights of stairs, is interested in acute rehab vs SNF.  Pt is very motivated to recover and participated well throughout PT session. See below for DC recs and updated POC.     Plan    Treatment Plan Status: Modify Current Treatment Plan  Type of Treatment: Bed Mobility, Gait Training, Neuro Re-Education / Balance, Prosthetics Training, Therapeutic Activities, Therapeutic Exercise, Stair Training  Treatment Frequency: 4 Times per Week  Treatment Duration: Until Therapy Goals Met    DC Equipment Recommendations: Unable to determine at this time    Discharge Recommendations: Recommend post-acute placement for additional physical therapy services prior to discharge home (pt has 2 flights of steps at home and lives alone, will need to be fully Indep to DC home safely. Recommend acute rehab or gait and stair training.)       12/27/23 1210   Cognition    Level of Consciousness Alert   Comments Pt is oriented x4, wants to DC home but realizes he prob will need to go to rehab first   Active ROM Lower Body    Active ROM Lower Body  X   Comments impaired R knee ROm, only tolerating about 40 deg flex, ext 0 deg   Strength Lower Body   Lower Body Strength  X   Comments R hip 4/5, knee NT   Sensation Lower Body   Lower Extremity Sensation   X   Comments pt is reporting some phantom paresthesia/pain   Supine Lower Body Exercise   Comments reviewed QS x10 reps   Other Treatments   Other Treatments Provided educated on post-op recovery from BKA   Balance   Sitting Balance (Static) Good   Sitting Balance  (Dynamic) Fair +   Standing Balance (Static) Fair   Standing Balance (Dynamic) Fair -   Weight Shift Sitting Fair   Skilled Intervention Postural Facilitation   Comments stdg with FWW   Bed Mobility    Supine to Sit Supervised   Sit to Supine Supervised   Gait Analysis   Gait Level Of Assist Unable to Participate   Comments Pt able to take a few hops in place with FWW   Functional Mobility   Sit to Stand Contact Guard Assist   Bed, Chair, Wheelchair Transfer Refused   Activity Tolerance   Sitting Edge of Bed 15 min   Standing 2 min with FWW CGA   Short Term Goals    Short Term Goal # 1 Pt will be able to perform bed mobility and sup <> sit Indep in 6 visits.   Short Term Goal # 2 Pt will be able to perform sit < >stand and transfer with FWW Yuval in 6 visits.   Short Term Goal # 3 Pt will be able to ambulate 100 ft with FWW Yuval in 6 visits.   Short Term Goal # 4 Pt will be able to go up.down flight of steps SBA in 6 visits.

## 2023-12-27 NOTE — PROGRESS NOTES
Telemetry Shift Summary     Rhythm: SR  Rate: 80s  Measurements: 0.16/0.08/0.38  Ectopy (reported by Monitor Tech): rare PVC/PAC     Normal Values  Rhythm: Sinus  HR:   Measurements: 0.12-0.20/0.06-0.10/0.30-0.52

## 2023-12-27 NOTE — CARE PLAN
The patient is Stable - Low risk of patient condition declining or worsening    Shift Goals  Clinical Goals: ABx, Pain Management, FSBG Control  Patient Goals: Comfort    Progress made toward(s) clinical / shift goals:    Problem: Pain - Standard  Goal: Alleviation of pain or a reduction in pain to the patient’s comfort goal  Outcome: Progressing     Problem: Knowledge Deficit - Standard  Goal: Patient and family/care givers will demonstrate understanding of plan of care, disease process/condition, diagnostic tests and medications  Outcome: Progressing     Problem: Knowledge Deficit - Diabetes  Goal: Patient will demonstrate knowledge of insulin injection, symptoms, and treatment of hypoglycemia and diet prior to discharge  Outcome: Progressing       Patient is not progressing towards the following goals:

## 2023-12-27 NOTE — PROGRESS NOTES
PT asked for updated order. Spoke with DR. Cutler and added the NWB status and reconsulted P.T. for post op eval.  --patient will be seen today  ---[employer] will also be by today for stump protector.

## 2023-12-27 NOTE — THERAPY
Physical Therapy Contact Note    Patient Name: Nestor Mckenzie   Age:  48 y.o., Sex:  male  Medical Record #: [id number]  Today's Date: 12/26/2023 12/26/23 1301   Treatment Variance   Reason For Missed Therapy Medical - Patient  in Procedure  (pt with plans for OR today, will f/u post surgery tomorrow)

## 2023-12-27 NOTE — OR NURSING
1655: To PACU from OR via bed, respirations spontaneous and non-laboredIcepack applied over c/d/i R knee surgical dressings, immobilizer placed per MD order. No pain or nausea evident, pt is sleeping, but arouses to stimulation.  1715: Still no S/S of pain or nausea, pt sleeping comfortably in the bed.  1735: Pt more awake, pain is tolerable, no nausea, dressings remain CDI.  1750: Pain remains tolerable, no nausea, tolerating decrease of supplemental O2.  1815: Meets criteria to transfer back to his room, pain remains tolerable, no nausea, resting comfortably.  1825: Report called to LEIDY Major and pt readied for transfer.  1830: Pt transported back to his room via bed by RN, pain remains tolerable, no nausea, awake and alert, on 2 LPM of supplemental O2, tank at 431 PSI. Dressings CDI.

## 2023-12-27 NOTE — ASSESSMENT & PLAN NOTE
Actinomyces turicensis positive  Continue with Unasyn  Repeat blood cultures showing no evidence of continued bacteremia

## 2023-12-27 NOTE — ANESTHESIA TIME REPORT
Anesthesia Start and Stop Event Times       Date Time Event    12/26/2023 1453 Anesthesia Start     1522 Ready for Procedure     1659 Anesthesia Stop          Responsible Staff  12/26/23      Name Role Begin End    Bob London D.O. Anesth 1453 1536    Ana Gomez M.D. Anesth 1536 1659          Overtime Reason:  no overtime (within assigned shift)    Comments:

## 2023-12-27 NOTE — OP REPORT
DATE OF OPERATION: 12/26/2023     SURGEON: Marco Antonio Kang M.D.    ANESTHESIOLOGIST: Anesthesiologist: Ana Gomez M.D.; Bob London D.O.    ANESTHESIA: General     SURGICAL FIRST ASST: none    PREOPERATIVE DIAGNOSIS: Right foot severe necrotic infection with soft tissue loss    POSTOPERATIVE DIAGNOSIS: Right foot severe necrotic infection with soft tissue loss    PROCEDURE: Right below-knee amputation    DETAILS OF PROCEDURE: The patient was met in the preoperative holding over the right toe was marked as correct operative site.  Respect discussion was had and consent was signed.  He was brought to the operating room where timeout confirmed patient, laterality, and procedure.  Anesthesia was induced and a wound established.  The limb was prepped and draped in normal sterile surgical fashion.  Tourniquet was placed onto the thigh.  The limb was prepped and draped in the normal sterile surgical fashion.  Limb was exsanguinated tourniquet was inflated and surgical setting.  We marked out our long posterior flap.  We then incised fully around the flap through the skin down to the level of the fascia.  We then came down to the anterior compartment and dissected down to the neurovascular bundle and identified this and tied it off and cut it.  We then placed Homans around the tibia and used a Fontanez to elevate some of the periosteum just proximal to our skin incision line.  We then used a saw to amputate the tibia.  We then beveled the distal edge of the tibia anteriorly.  We then placed Homans around the fibula and amputated the fibula 1 cm proximal to the tibial cut line.  We then used a bone hook and amputation knife to remove the leg at the posterior border of the tibia and fibula.  We then identified the neurovascular bundle and isolated this and tied off both the nerve and vessels and then cut them proximally.  The deep posterior compartment was debulked.  The flap was debulked until it had a nice symmetric  shape.  We then let the tourniquet down and achieve hemostasis.  We then irrigated well with 1 L normal saline.  Then began sewing the posterior fascia to the anterior fascia with 0 Vicryl to make a nice watertight fascial layer.  We then closed the superficial layer with a combination of 2-0 Vicryl for the subcutaneous tissue and 2-0 nylon for the skin.  Vancomycin powder had been placed into the wound prior to closure.  A soft dressing was placed and the patient was placed into a knee immobilizer and awoke from anesthesia and brought to the postoperative care and without complication    COMPLICATIONS: None    POST OP PLAN:   Nonweightbearing right lower extremity  Antibiotics per infectious disease  Will need stump protector from prosthetics tomorrow  Follow-up in orthopedic clinic with [facility] in 2 weeks for wound check  leave stitches for 3 to 4 weeks for healing time  ____________________________________   Marco Antonio Kang M.D.

## 2023-12-27 NOTE — DISCHARGE PLANNING
Case Management Discharge Planning    Admission Date: 12/21/2023  GMLOS: 5.2  ALOS: 6    6-Clicks ADL Score: 18  6-Clicks Mobility Score: 15  PT and/or OT Eval ordered: Yes  Post-acute Referrals Ordered: Yes  Post-acute Choice Obtained: No  Has referral(s) been sent to post-acute provider:  Yes      Anticipated Discharge Dispo: Discharge Disposition: D/T to SNF with Medicare cert in anticipation of skilled care (03)    DME Needed: No    Action(s) Taken: Referral(s) sent    Escalations Completed: None    Medically Clear: No    Barriers to Discharge: Medical clearance and Pending Placement    Course of Action  **1227  Per PT evaluation, patient would benefit from post-acute placement following his amputation. LSW sent referrals to [facility] for evaluation of a leased bed. Patient has no insurance.

## 2023-12-27 NOTE — PROGRESS NOTES
1830: Received report from Brea FORBES in PACU.     1900: Patient back on unit. Vitals taken. Alert and oriented x4. Family at bedside. Reporting 4/10 pain at this time.     1930: Report given to HALIE Boyce.

## 2023-12-27 NOTE — PROGRESS NOTES
[facility] Medicine Daily Progress Note    Date of Service  12/27/2023    Chief Complaint  Nestor Mckenzie Jr. is a 48 y.o. male admitted 12/21/2023 with black lesion on his right foot    Hospital Course  Patient is a 48-year-old male with diabetes who presented with black discoloration of the right toe with pain, redness and swelling.  Been doing well until 4 days prior when he thought he felt a spider bite on his right foot and since that time has had worsening pain redness with swelling.  He presented to the emergency room and had acute kidney injury, hyponatremia and elevated lactic acid consistent with sepsis.  Orthopedic surgery was consulted given the extensive nature of the necrosis.  He had large amount of debridement on 12/22 and tolerated the procedure well.  At this time patient is hoping to preserve as much of his foot as possible and is not interested in BKA unless it comes to know other options.    Interval Problem Update  12/23 patient is postop day 1 from extensive debridement of the right foot with wound VAC placement.  Patient states that he wants to save as much of his foot as possible and is not interested in BKA unless he tries all other options and is not prove successful.  12/24: Patient is slightly more open to amputation after discussion with infectious disease.  Given the large amount of tissue exposed with the dissection of the gangrene likely the only viable option to treat infection will be a below the knee amputation.  Patient still making ultimate decision as he not has not committed one way or the other.  12/25: Patient doing about the same.  He is much more agreeable to proceed with amputation at this time after discussion with infectious disease yesterday.  Will continue with Zosyn per ID recommendations and Zyvox discontinued.  Sodium and creatinine stable.  Anticipate discussion with orthopedic surgery tomorrow.  12/26: Patient agreeable with moving ahead with the amputation,  having mixed feelings but understands that he does need to move forward with this.  Pain under good control.  Continue with antibiotics per ID recommendations.  12/27: Patient status post amputation of the right lower extremity secondary to gangrenous infection.  Patient to be followed with orthopedics for stump protector.  Patient to follow-up at [facility] orthopedics in 2 weeks and further coordination with prosthesis.  Patient cleared for weightbearing as tolerated left lower extremity nonweightbearing right lower extremity.    I have discussed this patient's plan of care and discharge plan at IDT rounds today with Case Management, Nursing, Nursing leadership, and other members of the IDT team.    Consultants/Specialty  orthopedics    Code Status  Full Code    Disposition  The patient is not medically cleared for discharge to home or a post-acute facility.  Anticipate discharge to: home with close outpatient follow-up    I have placed the appropriate orders for post-discharge needs.    Review of Systems  Review of Systems   Constitutional:  Negative for chills and fever.   HENT:  Negative for congestion.    Eyes:  Negative for blurred vision and photophobia.   Respiratory:  Negative for cough and shortness of breath.    Cardiovascular:  Negative for chest pain, claudication and leg swelling.   Gastrointestinal:  Negative for abdominal pain, constipation, diarrhea, heartburn and vomiting.   Genitourinary:  Negative for dysuria and hematuria.   Musculoskeletal:  Positive for joint pain (Pain at the lateral aspect of the right lower extremity). Negative for myalgias.   Skin:  Negative for itching and rash.   Neurological:  Negative for dizziness, sensory change, speech change, weakness and headaches.   Psychiatric/Behavioral:  Negative for depression. The patient is not nervous/anxious and does not have insomnia.         Physical Exam  Temp:  [36.3 °C (97.3 °F)-37 °C (98.6 °F)] 37 °C (98.6 °F)  Pulse:  [80-95]  95  Resp:  [14-20] 18  BP: (118-179)/() 146/72  SpO2:  [88 %-100 %] 92 %    Physical Exam  Vitals and nursing note reviewed.   Constitutional:       General: He is not in acute distress.     Appearance: Normal appearance.   HENT:      Head: Normocephalic and atraumatic.   Eyes:      General: No scleral icterus.     Extraocular Movements: Extraocular movements intact.   Cardiovascular:      Rate and Rhythm: Normal rate and regular rhythm.      Pulses: Normal pulses.      Heart sounds: Normal heart sounds. No murmur heard.  Pulmonary:      Effort: Pulmonary effort is normal. No respiratory distress.      Breath sounds: Normal breath sounds. No wheezing, rhonchi or rales.   Abdominal:      General: Abdomen is flat. Bowel sounds are normal. There is no distension.      Palpations: Abdomen is soft.      Tenderness: There is no rebound.   Musculoskeletal:         General: No swelling or tenderness.      Cervical back: Normal range of motion and neck supple.      Comments: Status post BKA, knee immobilizer and dressing in place   Lymphadenopathy:      Cervical: No cervical adenopathy.   Skin:     Coloration: Skin is not jaundiced.      Findings: No erythema.   Neurological:      General: No focal deficit present.      Mental Status: He is alert and oriented to person, place, and time. Mental status is at baseline.      Cranial Nerves: No cranial nerve deficit.   Psychiatric:         Mood and Affect: Mood normal.         Behavior: Behavior normal.         Fluids    Intake/Output Summary (Last 24 hours) at 12/27/2023 1340  Last data filed at 12/27/2023 0600  Gross per 24 hour   Intake 3080 ml   Output 600 ml   Net 2480 ml         Laboratory  Recent Labs     12/25/23  0032 12/26/23  0024 12/27/23  0212   WBC 9.1 8.3 9.3   RBC 3.46* 3.49* 3.66*   HEMOGLOBIN 10.0* 10.0* 10.4*   HEMATOCRIT 30.0* 30.0* 32.0*   MCV 86.7 86.0 87.4   MCH 28.9 28.7 28.4   MCHC 33.3 33.3 32.5   RDW 38.0 37.0 37.8   PLATELETCT 293 293 317   MPV  "9.4 9.3 9.0       Recent Labs     12/25/23  0032 12/26/23  0024 12/27/23  0212   SODIUM 131* 131* 134*   POTASSIUM 4.0 3.8 4.2   CHLORIDE 98 97 98   CO2 26 26 26   GLUCOSE 172* 155* 120*   BUN 10 10 11   CREATININE 1.27 1.20 1.19   CALCIUM 7.3* 7.3* 7.6*                     Imaging  DX-FOOT-COMPLETE 3+ RIGHT   Final Result      1.  Acute fracture of the base of the fifth metatarsal.      2.  Extensive soft tissue gas involving the mid and forefoot bilaterally likely representing tissue necrosis/gangrene.      3.  Demineralization of the fifth metatarsal possibly representing presence of osteomyelitis.      DX-CHEST-PORTABLE (1 VIEW)   Final Result      No evidence of acute cardiopulmonary process.           Assessment/Plan  * Sepsis (HCC)- (present on admission)  Assessment & Plan  Sepsis criteria has resolved      Bacteremia  Assessment & Plan  Actinomyces turicensis positive  Continue with Unasyn  Repeat blood cultures to be collected    Status post below-knee amputation of right lower extremity (HCC)  Assessment & Plan  Postop day 1 from right sided BKA,  Dr. Kang continues to consult  Stump protector to be fitted today    Hypomagnesemia  Assessment & Plan  Replace as needed  monitor    Leukocytosis  Assessment & Plan  In the setting of infection  Monitor  Continue antibiotics.    Abnormal EKG- (present on admission)  Assessment & Plan  Patient states he feels well  No need for echo    Cellulitis of right lower extremity- (present on admission)  Assessment & Plan  I will start Zosyn and Zyvox, follow on cultures and sensitivities  There is extensive soft tissue gas concerning for gangrene and necrosis  Orthopedics consulted     12/22: Patient underwent: \"Right foot irrigation and excisional debridement down to bone, Right foot toe amputation of the fourth and fifth toes at the metatarsal phalangeal joint, Right foot wound VAC placement\" by orthopedic surgery yesterday.  Orthopedic surgery does not believe that " the limb is salvageable and they are planning on potentially doing a BKA but this will not happen until most likely early next week.  Orthopedic surgery to discuss this with patient today.  Will continue with antibiotics for now.  Pending cultures.  Once we have any culture results we will consult ID.  For now we will continue with Zyvox and Zosyn.    12/24: Preoperative wound positive for Pasteurella and group B strep, per ID continue with Zosyn, appreciate consultation  12/25: Continue with Zosyn, patient will need further amputation  12/26: Patient for BKA today with Dr. Kang  12/27: Resolved with BKA    Dehydration- (present on admission)  Assessment & Plan  Likely secondary to reduced oral intake, and increase in insensible loss due to infection.  Encourage oral intake as tolerated, antiemetics as needed.  Intravenous hydration until adequate oral intake is achieved.     Anemia- (present on admission)  Assessment & Plan  There could be an acute blood loss component due to recent surgery  Monitor for any signs of bleeding      Acute hematogenous osteomyelitis of right foot (HCC)- (present on admission)  Assessment & Plan  Continue Zosyn  Appreciate ID recommendations  Patient agreeable to proceed with BKA,    Fracture of the fifth metatarsal- (present on admission)  Assessment & Plan  Orthopedics consulted     Type 2 diabetes mellitus with diabetic peripheral angiopathy and gangrene, without long-term current use of insulin (HCC)- (present on admission)  Assessment & Plan  With hyperglycemia  I will check a glycated hemoglobin    I will start short acting insulin for now  I will order Accu-Checks, hypoglycemia protocol  Adjust according to blood sugars trend.     Sugars under control    SRIRAM (acute kidney injury) (HCC)- (present on admission)  Assessment & Plan  Creatinine back to baseline 1.27    Hyponatremia- (present on admission)  Assessment & Plan  Seems to be improving  Continue with IVF for now    Gangrene  "associated with diabetes mellitus (HCC)- (present on admission)  Assessment & Plan        12/22: Patient underwent: \"Right foot irrigation and excisional debridement down to bone, Right foot toe amputation of the fourth and fifth toes at the metatarsal phalangeal joint, Right foot wound VAC placement\" by orthopedic surgery yesterday.  Orthopedic surgery does not believe that the limb is salvageable and they are planning on potentially doing a BKA but this will not happen until most likely early next week.  Orthopedic surgery to discuss this with patient today.  Will continue with antibiotics for now.  Pending cultures.  Once we have any culture results we will consult ID.  For now we will continue with Zyvox and Zosyn.    12/24: Patient now more amenable to the possibility of amputation as this will likely be the only viable option for recovery based on length of antibiotic and as also the significant debridement that was necessary to dissect the gangrene.  Wound VAC in place and under this tendon and bone exposed.  Discuss further with orthopedic surgery    12/25: Patient in good spirits, he has come to terms with moving forward with amputation and does feel that this will give him the best opportunity for independence and resolution of infection.  Will discuss further with orthopedic surgery likely tomorrow.    12/26: Patient for BKA later today around 2, Dr Kang    12/27: Resolved with BKA         VTE prophylaxis:   SCDs/TEDs      I have performed a physical exam and reviewed and updated ROS and Plan today (12/27/2023). In review of yesterday's note (12/26/2023), there are no changes except as documented above.        "

## 2023-12-27 NOTE — ANESTHESIA POSTPROCEDURE EVALUATION
Patient: Nestor Mckenzie Jr.    Procedure Summary       Date: 12/26/23 Room / Location: [place] [address] / SURGERY [facility]    Anesthesia Start: 1453 Anesthesia Stop: 1659    Procedure: AMPUTATION, BELOW KNEE (Right: Leg Lower) Diagnosis: (right necrotic forefoot infection status postdebridement)    Surgeons: Marco Antonio Kang M.D. Responsible Provider: Ana Gomez M.D.    Anesthesia Type: general ASA Status: 3 - Emergent            Final Anesthesia Type: general  Last vitals  BP   Blood Pressure: (!) 179/102    Temp   36.5 °C (97.7 °F)    Pulse   89   Resp   18    SpO2   99 %      Anesthesia Post Evaluation    Patient location during evaluation: PACU  Patient participation: complete - patient participated  Level of consciousness: awake and alert    Airway patency: patent  Anesthetic complications: no  Cardiovascular status: hemodynamically stable  Respiratory status: acceptable  Hydration status: euvolemic    PONV: none          There were no known notable events for this encounter.     Nurse Pain Score: 5 (NPRS)

## 2023-12-28 ENCOUNTER — APPOINTMENT (OUTPATIENT)
Dept: RADIOLOGY | Facility: MEDICAL CENTER | Age: 48
DRG: 854 | End: 2023-12-28
Attending: INTERNAL MEDICINE

## 2023-12-28 LAB
ANION GAP SERPL CALC-SCNC: 12 MMOL/L (ref 7–16)
BASOPHILS # BLD AUTO: 0.5 % (ref 0–1.8)
BASOPHILS # BLD: 0.06 K/UL (ref 0–0.12)
BUN SERPL-MCNC: 12 MG/DL (ref 8–22)
CALCIUM SERPL-MCNC: 7.4 MG/DL (ref 8.4–10.2)
CHLORIDE SERPL-SCNC: 95 MMOL/L (ref 96–112)
CO2 SERPL-SCNC: 29 MMOL/L (ref 20–33)
CREAT SERPL-MCNC: 1.56 MG/DL (ref 0.5–1.4)
EOSINOPHIL # BLD AUTO: 0.21 K/UL (ref 0–0.51)
EOSINOPHIL NFR BLD: 1.8 % (ref 0–6.9)
ERYTHROCYTE [DISTWIDTH] IN BLOOD BY AUTOMATED COUNT: 38.2 FL (ref 35.9–50)
ERYTHROCYTE [DISTWIDTH] IN BLOOD BY AUTOMATED COUNT: 38.7 FL (ref 35.9–50)
GFR SERPLBLD CREATININE-BSD FMLA CKD-EPI: 54 ML/MIN/1.73 M 2
GLUCOSE BLD STRIP.AUTO-MCNC: 124 MG/DL (ref 65–99)
GLUCOSE BLD STRIP.AUTO-MCNC: 145 MG/DL (ref 65–99)
GLUCOSE BLD STRIP.AUTO-MCNC: 215 MG/DL (ref 65–99)
GLUCOSE BLD STRIP.AUTO-MCNC: 219 MG/DL (ref 65–99)
GLUCOSE SERPL-MCNC: 117 MG/DL (ref 65–99)
HCT VFR BLD AUTO: 30.9 % (ref 42–52)
HCT VFR BLD AUTO: 31.9 % (ref 42–52)
HGB BLD-MCNC: 10.1 G/DL (ref 14–18)
HGB BLD-MCNC: 10.3 G/DL (ref 14–18)
IMM GRANULOCYTES # BLD AUTO: 0.04 K/UL (ref 0–0.11)
IMM GRANULOCYTES NFR BLD AUTO: 0.4 % (ref 0–0.9)
LYMPHOCYTES # BLD AUTO: 1.58 K/UL (ref 1–4.8)
LYMPHOCYTES NFR BLD: 13.8 % (ref 22–41)
MCH RBC QN AUTO: 28.6 PG (ref 27–33)
MCH RBC QN AUTO: 28.7 PG (ref 27–33)
MCHC RBC AUTO-ENTMCNC: 32.3 G/DL (ref 32.3–36.5)
MCHC RBC AUTO-ENTMCNC: 32.7 G/DL (ref 32.3–36.5)
MCV RBC AUTO: 87.8 FL (ref 81.4–97.8)
MCV RBC AUTO: 88.6 FL (ref 81.4–97.8)
MONOCYTES # BLD AUTO: 0.66 K/UL (ref 0–0.85)
MONOCYTES NFR BLD AUTO: 5.8 % (ref 0–13.4)
NEUTROPHILS # BLD AUTO: 8.86 K/UL (ref 1.82–7.42)
NEUTROPHILS NFR BLD: 77.7 % (ref 44–72)
NRBC # BLD AUTO: 0 K/UL
NRBC BLD-RTO: 0 /100 WBC (ref 0–0.2)
PLATELET # BLD AUTO: 297 K/UL (ref 164–446)
PLATELET # BLD AUTO: 338 K/UL (ref 164–446)
PMV BLD AUTO: 8.9 FL (ref 9–12.9)
PMV BLD AUTO: 9 FL (ref 9–12.9)
POTASSIUM SERPL-SCNC: 3.7 MMOL/L (ref 3.6–5.5)
RBC # BLD AUTO: 3.52 M/UL (ref 4.7–6.1)
RBC # BLD AUTO: 3.6 M/UL (ref 4.7–6.1)
SODIUM SERPL-SCNC: 136 MMOL/L (ref 135–145)
WBC # BLD AUTO: 11.4 K/UL (ref 4.8–10.8)
WBC # BLD AUTO: 9.1 K/UL (ref 4.8–10.8)

## 2023-12-28 PROCEDURE — 700111 HCHG RX REV CODE 636 W/ 250 OVERRIDE (IP): Performed by: INTERNAL MEDICINE

## 2023-12-28 PROCEDURE — 97166 OT EVAL MOD COMPLEX 45 MIN: CPT

## 2023-12-28 PROCEDURE — 770006 HCHG ROOM/CARE - MED/SURG/GYN SEMI*

## 2023-12-28 PROCEDURE — 94760 N-INVAS EAR/PLS OXIMETRY 1: CPT

## 2023-12-28 PROCEDURE — A9270 NON-COVERED ITEM OR SERVICE: HCPCS | Performed by: HOSPITALIST

## 2023-12-28 PROCEDURE — 97535 SELF CARE MNGMENT TRAINING: CPT

## 2023-12-28 PROCEDURE — 80048 BASIC METABOLIC PNL TOTAL CA: CPT

## 2023-12-28 PROCEDURE — 700105 HCHG RX REV CODE 258: Performed by: INTERNAL MEDICINE

## 2023-12-28 PROCEDURE — 700102 HCHG RX REV CODE 250 W/ 637 OVERRIDE(OP): Performed by: INTERNAL MEDICINE

## 2023-12-28 PROCEDURE — 36415 COLL VENOUS BLD VENIPUNCTURE: CPT

## 2023-12-28 PROCEDURE — 85025 COMPLETE CBC W/AUTO DIFF WBC: CPT

## 2023-12-28 PROCEDURE — 82962 GLUCOSE BLOOD TEST: CPT | Mod: 91

## 2023-12-28 PROCEDURE — 700102 HCHG RX REV CODE 250 W/ 637 OVERRIDE(OP): Performed by: HOSPITALIST

## 2023-12-28 PROCEDURE — 700105 HCHG RX REV CODE 258: Performed by: HOSPITALIST

## 2023-12-28 PROCEDURE — 85027 COMPLETE CBC AUTOMATED: CPT

## 2023-12-28 PROCEDURE — A9270 NON-COVERED ITEM OR SERVICE: HCPCS | Performed by: INTERNAL MEDICINE

## 2023-12-28 PROCEDURE — 99232 SBSQ HOSP IP/OBS MODERATE 35: CPT | Performed by: INTERNAL MEDICINE

## 2023-12-28 RX ORDER — LISINOPRIL 5 MG/1
10 TABLET ORAL
Status: DISCONTINUED | OUTPATIENT
Start: 2023-12-28 | End: 2023-12-28

## 2023-12-28 RX ORDER — OMEPRAZOLE 20 MG/1
20 CAPSULE, DELAYED RELEASE ORAL DAILY
Status: DISCONTINUED | OUTPATIENT
Start: 2023-12-28 | End: 2023-12-29 | Stop reason: HOSPADM

## 2023-12-28 RX ADMIN — AMPICILLIN AND SULBACTAM 3 G: 1; 2 INJECTION, POWDER, FOR SOLUTION INTRAMUSCULAR; INTRAVENOUS at 12:28

## 2023-12-28 RX ADMIN — HYDRALAZINE HYDROCHLORIDE 20 MG: 20 INJECTION, SOLUTION INTRAMUSCULAR; INTRAVENOUS at 11:21

## 2023-12-28 RX ADMIN — INSULIN HUMAN 2 UNITS: 100 INJECTION, SOLUTION PARENTERAL at 17:26

## 2023-12-28 RX ADMIN — SODIUM CHLORIDE, POTASSIUM CHLORIDE, SODIUM LACTATE AND CALCIUM CHLORIDE: 600; 310; 30; 20 INJECTION, SOLUTION INTRAVENOUS at 22:11

## 2023-12-28 RX ADMIN — OMEPRAZOLE 20 MG: 20 CAPSULE, DELAYED RELEASE ORAL at 13:56

## 2023-12-28 RX ADMIN — INSULIN HUMAN 2 UNITS: 100 INJECTION, SOLUTION PARENTERAL at 20:44

## 2023-12-28 RX ADMIN — AMPICILLIN AND SULBACTAM 3 G: 1; 2 INJECTION, POWDER, FOR SOLUTION INTRAMUSCULAR; INTRAVENOUS at 23:43

## 2023-12-28 RX ADMIN — METOPROLOL TARTRATE 25 MG: 25 TABLET, FILM COATED ORAL at 17:19

## 2023-12-28 RX ADMIN — AMPICILLIN AND SULBACTAM 3 G: 1; 2 INJECTION, POWDER, FOR SOLUTION INTRAMUSCULAR; INTRAVENOUS at 00:10

## 2023-12-28 RX ADMIN — AMPICILLIN AND SULBACTAM 3 G: 1; 2 INJECTION, POWDER, FOR SOLUTION INTRAMUSCULAR; INTRAVENOUS at 06:03

## 2023-12-28 RX ADMIN — SODIUM CHLORIDE, POTASSIUM CHLORIDE, SODIUM LACTATE AND CALCIUM CHLORIDE: 600; 310; 30; 20 INJECTION, SOLUTION INTRAVENOUS at 13:06

## 2023-12-28 RX ADMIN — AMPICILLIN AND SULBACTAM 3 G: 1; 2 INJECTION, POWDER, FOR SOLUTION INTRAMUSCULAR; INTRAVENOUS at 17:22

## 2023-12-28 RX ADMIN — METOPROLOL TARTRATE 25 MG: 25 TABLET, FILM COATED ORAL at 07:59

## 2023-12-28 RX ADMIN — OXYCODONE HYDROCHLORIDE 10 MG: 10 TABLET ORAL at 10:21

## 2023-12-28 ASSESSMENT — COGNITIVE AND FUNCTIONAL STATUS - GENERAL
TOILETING: TOTAL
DAILY ACTIVITIY SCORE: 17
HELP NEEDED FOR BATHING: A LOT
DRESSING REGULAR LOWER BODY CLOTHING: A LOT
SUGGESTED CMS G CODE MODIFIER DAILY ACTIVITY: CK

## 2023-12-28 ASSESSMENT — PAIN DESCRIPTION - PAIN TYPE
TYPE: ACUTE PAIN

## 2023-12-28 ASSESSMENT — ENCOUNTER SYMPTOMS
DIZZINESS: 0
CLAUDICATION: 0
DIARRHEA: 0
BLURRED VISION: 0
SHORTNESS OF BREATH: 0
WEAKNESS: 0
VOMITING: 0
SENSORY CHANGE: 0
HEARTBURN: 0
HEADACHES: 0
CONSTIPATION: 0
COUGH: 0
DEPRESSION: 0
FEVER: 0
ABDOMINAL PAIN: 0
MYALGIAS: 0
SPEECH CHANGE: 0
CHILLS: 0
NERVOUS/ANXIOUS: 0
PHOTOPHOBIA: 0
INSOMNIA: 0

## 2023-12-28 NOTE — PROGRESS NOTES
Telemetry Shift Summary    Rhythm SR  HR Range   Ectopy none noted by tech  Measurements 0.12/0.08/0.36      Normal Values  Rhythm SR  HR Range:   Measurements: 0.12-0.20/0.06-0.10/0.30-0.52

## 2023-12-28 NOTE — PROGRESS NOTES
S: pain controlled    O: stump protector fitting well      A/P  S/p R BKA for gangrene. Doing well  - abx per ID  - maintain stump protector  - f/u in orthopedic clinic in 2 weeks for wound check

## 2023-12-28 NOTE — DISCHARGE PLANNING
Case Management Discharge Planning    Admission Date: 12/21/2023  GMLOS: 5.2  ALOS: 7    6-Clicks ADL Score: 18  6-Clicks Mobility Score: 15  PT and/or OT Eval ordered: Yes  Post-acute Referrals Ordered: Yes  Post-acute Choice Obtained: Yes  Has referral(s) been sent to post-acute provider:  Yes      Anticipated Discharge Dispo: Discharge Disposition: D/T to SNF with Medicare cert in anticipation of skilled care (03)    DME Needed: No    Action(s) Taken: Choice obtained    Escalations Completed: None    Medically Clear: No    Barriers to Discharge: Medical clearance    Course of Action  **[id number]  LSW spoke to patient at bedside. Discussed post-acute placement option at [facility] via leased bed. Patient was agreeable to this option citing he knows he needs rehab. Choice form signed and faxed to DPA. Patient is not medically cleared at this time.

## 2023-12-28 NOTE — PROGRESS NOTES
[facility] Medicine Daily Progress Note    Date of Service  12/28/2023    Chief Complaint  Nestor Mckenzie Jr. is a 48 y.o. male admitted 12/21/2023 with black lesion on his right foot    Hospital Course  Patient is a 48-year-old male with diabetes who presented with black discoloration of the right toe with pain, redness and swelling.  Been doing well until 4 days prior when he thought he felt a spider bite on his right foot and since that time has had worsening pain redness with swelling.  He presented to the emergency room and had acute kidney injury, hyponatremia and elevated lactic acid consistent with sepsis.  Orthopedic surgery was consulted given the extensive nature of the necrosis.  He had large amount of debridement on 12/22 and tolerated the procedure well.  At this time patient is hoping to preserve as much of his foot as possible and is not interested in BKA unless it comes to know other options.    Interval Problem Update  12/23 patient is postop day 1 from extensive debridement of the right foot with wound VAC placement.  Patient states that he wants to save as much of his foot as possible and is not interested in BKA unless he tries all other options and is not prove successful.  12/24: Patient is slightly more open to amputation after discussion with infectious disease.  Given the large amount of tissue exposed with the dissection of the gangrene likely the only viable option to treat infection will be a below the knee amputation.  Patient still making ultimate decision as he not has not committed one way or the other.  12/25: Patient doing about the same.  He is much more agreeable to proceed with amputation at this time after discussion with infectious disease yesterday.  Will continue with Zosyn per ID recommendations and Zyvox discontinued.  Sodium and creatinine stable.  Anticipate discussion with orthopedic surgery tomorrow.  12/26: Patient agreeable with moving ahead with the amputation,  having mixed feelings but understands that he does need to move forward with this.  Pain under good control.  Continue with antibiotics per ID recommendations.  12/27: Patient status post amputation of the right lower extremity secondary to gangrenous infection.  Patient to be followed with orthopedics for stump protector.  Patient to follow-up at [facility] orthopedics in 2 weeks and further coordination with prosthesis.  Patient cleared for weightbearing as tolerated left lower extremity nonweightbearing right lower extremity.  12/28 patient with significant pain overnight and states that he did not sleep again.  The brace was significantly tight therefore I loosened it and placed patient on a pillow to help decrease the inflammation.  Patient stated that with the changes in elevation that the pain is markedly improved.  Anticipate discharge to [place] tomorrow as long as he continues to show improvement.    I have discussed this patient's plan of care and discharge plan at IDT rounds today with Case Management, Nursing, Nursing leadership, and other members of the IDT team.    Consultants/Specialty  orthopedics    Code Status  Full Code    Disposition  The patient is not medically cleared for discharge to home or a post-acute facility.  Anticipate discharge to: skilled nursing facility    I have placed the appropriate orders for post-discharge needs.    Review of Systems  Review of Systems   Constitutional:  Negative for chills and fever.   HENT:  Negative for congestion.    Eyes:  Negative for blurred vision and photophobia.   Respiratory:  Negative for cough and shortness of breath.    Cardiovascular:  Negative for chest pain, claudication and leg swelling.   Gastrointestinal:  Negative for abdominal pain, constipation, diarrhea, heartburn and vomiting.   Genitourinary:  Negative for dysuria and hematuria.   Musculoskeletal:  Positive for joint pain (Pain at the lateral aspect of the right lower extremity).  Negative for myalgias.   Skin:  Negative for itching and rash.   Neurological:  Negative for dizziness, sensory change, speech change, weakness and headaches.   Psychiatric/Behavioral:  Negative for depression. The patient is not nervous/anxious and does not have insomnia.         Physical Exam  Temp:  [36.6 °C (97.9 °F)-37.3 °C (99.1 °F)] 37.3 °C (99.1 °F)  Pulse:  [83-97] 85  Resp:  [16-20] 16  BP: (119-173)/(61-92) 119/61  SpO2:  [85 %-98 %] 95 %    Physical Exam  Vitals and nursing note reviewed.   Constitutional:       General: He is not in acute distress.     Appearance: Normal appearance.   HENT:      Head: Normocephalic and atraumatic.   Eyes:      General: No scleral icterus.     Extraocular Movements: Extraocular movements intact.   Cardiovascular:      Rate and Rhythm: Normal rate and regular rhythm.      Pulses: Normal pulses.      Heart sounds: Normal heart sounds. No murmur heard.  Pulmonary:      Effort: Pulmonary effort is normal. No respiratory distress.      Breath sounds: Normal breath sounds. No wheezing, rhonchi or rales.   Abdominal:      General: Abdomen is flat. Bowel sounds are normal. There is no distension.      Palpations: Abdomen is soft.      Tenderness: There is no rebound.   Musculoskeletal:         General: No swelling or tenderness.      Cervical back: Normal range of motion and neck supple.      Comments: Status post BKA, stump protector in place, knee brace in place, loosened as patient was having significant tightness and pain.  Elevated on pillow with significant relief.   Lymphadenopathy:      Cervical: No cervical adenopathy.   Skin:     Coloration: Skin is not jaundiced.      Findings: No erythema.   Neurological:      General: No focal deficit present.      Mental Status: He is alert and oriented to person, place, and time. Mental status is at baseline.      Cranial Nerves: No cranial nerve deficit.   Psychiatric:         Mood and Affect: Mood normal.         Behavior:  Behavior normal.         Fluids    Intake/Output Summary (Last 24 hours) at 12/28/2023 1333  Last data filed at 12/28/2023 0900  Gross per 24 hour   Intake 210 ml   Output 560 ml   Net -350 ml         Laboratory  Recent Labs     12/26/23  0024 12/27/23 0212 12/28/23  0041   WBC 8.3 9.3 9.1   RBC 3.49* 3.66* 3.60*   HEMOGLOBIN 10.0* 10.4* 10.3*   HEMATOCRIT 30.0* 32.0* 31.9*   MCV 86.0 87.4 88.6   MCH 28.7 28.4 28.6   MCHC 33.3 32.5 32.3   RDW 37.0 37.8 38.7   PLATELETCT 293 317 338   MPV 9.3 9.0 9.0       Recent Labs     12/26/23  0024 12/27/23 0212 12/28/23  0041   SODIUM 131* 134* 136   POTASSIUM 3.8 4.2 3.7   CHLORIDE 97 98 95*   CO2 26 26 29   GLUCOSE 155* 120* 117*   BUN 10 11 12   CREATININE 1.20 1.19 1.56*   CALCIUM 7.3* 7.6* 7.4*                     Imaging  DX-FOOT-COMPLETE 3+ RIGHT   Final Result      1.  Acute fracture of the base of the fifth metatarsal.      2.  Extensive soft tissue gas involving the mid and forefoot bilaterally likely representing tissue necrosis/gangrene.      3.  Demineralization of the fifth metatarsal possibly representing presence of osteomyelitis.      DX-CHEST-PORTABLE (1 VIEW)   Final Result      No evidence of acute cardiopulmonary process.      US-SPLEEN    (Results Pending)        Assessment/Plan  * Sepsis (HCC)- (present on admission)  Assessment & Plan  Sepsis criteria has resolved      Bacteremia  Assessment & Plan  Actinomyces turicensis positive  Continue with Unasyn  Repeat blood cultures showing no evidence of continued bacteremia    Status post below-knee amputation of right lower extremity (HCC)  Assessment & Plan  Postop day 1 from right sided BKA,  Dr. Kang continues to consult  Stump protector to be fitted today    Hypomagnesemia  Assessment & Plan  Replace as needed  monitor    Leukocytosis  Assessment & Plan  In the setting of infection  Monitor  Continue antibiotics.    Abnormal EKG- (present on admission)  Assessment & Plan  Patient states he feels  "well  No need for echo    Cellulitis of right lower extremity- (present on admission)  Assessment & Plan  I will start Zosyn and Zyvox, follow on cultures and sensitivities  There is extensive soft tissue gas concerning for gangrene and necrosis  Orthopedics consulted     12/22: Patient underwent: \"Right foot irrigation and excisional debridement down to bone, Right foot toe amputation of the fourth and fifth toes at the metatarsal phalangeal joint, Right foot wound VAC placement\" by orthopedic surgery yesterday.  Orthopedic surgery does not believe that the limb is salvageable and they are planning on potentially doing a BKA but this will not happen until most likely early next week.  Orthopedic surgery to discuss this with patient today.  Will continue with antibiotics for now.  Pending cultures.  Once we have any culture results we will consult ID.  For now we will continue with Zyvox and Zosyn.    12/24: Preoperative wound positive for Pasteurella and group B strep, per ID continue with Zosyn, appreciate consultation  12/25: Continue with Zosyn, patient will need further amputation  12/26: Patient for BKA today with Dr. Kang  12/27: Resolved with BKA      Dehydration- (present on admission)  Assessment & Plan  Likely secondary to reduced oral intake, and increase in insensible loss due to infection.  Encourage oral intake as tolerated, antiemetics as needed.  Intravenous hydration until adequate oral intake is achieved.     Anemia- (present on admission)  Assessment & Plan  There could be an acute blood loss component due to recent surgery  Monitor for any signs of bleeding      Acute hematogenous osteomyelitis of right foot (HCC)- (present on admission)  Assessment & Plan  Continue Zosyn  Appreciate ID recommendations  Patient agreeable to proceed with BKA,    Fracture of the fifth metatarsal- (present on admission)  Assessment & Plan  Orthopedics consulted     Type 2 diabetes mellitus with diabetic peripheral " "angiopathy and gangrene, without long-term current use of insulin (HCC)- (present on admission)  Assessment & Plan  With hyperglycemia  I will check a glycated hemoglobin    I will start short acting insulin for now  I will order Accu-Checks, hypoglycemia protocol  Adjust according to blood sugars trend.     Sugars under control    SRIRAM (acute kidney injury) (HCC)- (present on admission)  Assessment & Plan  Creatinine back to baseline 1.27    Hyponatremia- (present on admission)  Assessment & Plan  Seems to be improving  Continue with IVF for now    Gangrene associated with diabetes mellitus (HCC)- (present on admission)  Assessment & Plan        12/22: Patient underwent: \"Right foot irrigation and excisional debridement down to bone, Right foot toe amputation of the fourth and fifth toes at the metatarsal phalangeal joint, Right foot wound VAC placement\" by orthopedic surgery yesterday.  Orthopedic surgery does not believe that the limb is salvageable and they are planning on potentially doing a BKA but this will not happen until most likely early next week.  Orthopedic surgery to discuss this with patient today.  Will continue with antibiotics for now.  Pending cultures.  Once we have any culture results we will consult ID.  For now we will continue with Zyvox and Zosyn.    12/24: Patient now more amenable to the possibility of amputation as this will likely be the only viable option for recovery based on length of antibiotic and as also the significant debridement that was necessary to dissect the gangrene.  Wound VAC in place and under this tendon and bone exposed.  Discuss further with orthopedic surgery    12/25: Patient in good spirits, he has come to terms with moving forward with amputation and does feel that this will give him the best opportunity for independence and resolution of infection.  Will discuss further with orthopedic surgery likely tomorrow.    12/26: Patient for BKA later today around 2Dr" Jorge Luis    12/27: Resolved with BKA         VTE prophylaxis:   SCDs/TEDs      I have performed a physical exam and reviewed and updated ROS and Plan today (12/28/2023). In review of yesterday's note (12/27/2023), there are no changes except as documented above.

## 2023-12-28 NOTE — PROGRESS NOTES
ID brief note  S/p BKA 12/26 due to gangrene with ext tissue loss-definitive source control  Plan for [place]  Can continue on IV Unasyn or switch to oral Zyvox 600 mg PO BID to complete course of treatment bacteremia-Actino found in both foot and blood so doubt contaminant  Wound care  Stop date 1/9/24 if 12/27 blood cultures remain negative   FU ID clinic 1-2 weeks after discharge  OK to see Fili RASHID

## 2023-12-28 NOTE — THERAPY
Occupational Therapy   Initial Evaluation     Patient Name: Nestor Mckenzie Jr.  Age:  48 y.o., Sex:  male  Medical Record #: [id number]  Today's Date: 12/28/2023     Precautions  Precautions: (P) Fall Risk, Non Weight Bearing Right Lower Extremity  Comments: (P) R BKA    Assessment  Patient is 48 y.o. male presented 12/21/23 discoloration R toe w/ pain/redness and swelling.  Admitted with a diagnosis of sepsis.  PMH DM.  Pt is POD#1 R BKA. Additional factors influencing patient status / progress: Impaired balance RLE pain, impaired safety awareness, NWB RLE.   Pt resides alone in a 3rd floor apartment w/ stair access only in [place], [place].  Family in the area are available to provide assist on a limited basis.  PLOF Indep for ADL's, transfers and ambulation w/out a device.  Therapist reviewed environmental/home safety, fall precautions, AE/DME, ADL's and transfers.         Plan    Occupational Therapy Initial Treatment Plan   Treatment Interventions: (P) Self Care / Activities of Daily Living, Adaptive Equipment, Neuro Re-Education / Balance, Therapeutic Exercises, Therapeutic Activity  Treatment Frequency: (P) 3 Times per Week  Duration: (P) Until Therapy Goals Met    DC Equipment Recommendations: (P) Tub Transfer Bench, Bed Side Commode, Reacher  Discharge Recommendations: (P) Recommend post-acute placement for additional occupational therapy services prior to discharge home - Recommending IRF.     Subjective    Pt was alert and cooperative w/ tx.     Objective       12/28/23 1453    Services   Is patient using  services for this encounter? No   History of Falls   History of Falls No   Precautions   Precautions Fall Risk;Non Weight Bearing Right Lower Extremity   Comments R BKA   Vitals   Pulse 87   Patient BP Position Sitting   Respiration 17   Pulse Oximetry 93 %   O2 (LPM) 0   O2 Delivery Device Room air w/o2 available   Pain   Intervention Rest;Repositioned   Pain 0 - 10 Group   Location Leg    Description Aching   Comfort Goal Comfort with Movement;Perform Activity   Therapist Pain Assessment Prior to Activity;During Activity;Post Activity   Non Verbal Descriptors   Non Verbal Scale  Calm;Unlabored Breathing   Cognition    Cognition / Consciousness WDL   Level of Consciousness Alert   Safety Awareness Impulsive;Impaired   Balance Assessment   Sitting Balance (Static) Fair +   Sitting Balance (Dynamic) Fair   Standing Balance (Static) Fair -   Standing Balance (Dynamic) Poor +   Weight Shift Sitting Fair   Weight Shift Standing Absent   Bed Mobility    Supine to Sit Supervised   Sit to Supine Supervised   ADL Assessment   Upper Body Dressing Modified Independent   Lower Body Dressing Maximal Assist   Toileting Total Assist  (Assist w/ LB clothing management and post BM hygiene via FWW and BSC)   How much help from another person does the patient currently need...   Putting on and taking off regular lower body clothing? 2   Bathing (including washing, rinsing, and drying)? 2   Toileting, which includes using a toilet, bedpan, or urinal? 1   Putting on and taking off regular upper body clothing? 4   Taking care of personal grooming such as brushing teeth? 4   Eating meals? 4   6 Clicks Daily Activity Score 17   Functional Mobility   Sit to Stand Minimal Assist   Toilet Transfers Minimal Assist  (To/from EOB sitting and BSC via FWW)   Edema / Skin Assessment   Edema / Skin  Not Assessed   Short Term Goals   Short Term Goal # 1 Mod I LB clothing management via AE/DME   Short Term Goal # 2 CGA to transfer to/from commode via FWW + NWB RLE   Short Term Goal # 3 Min assist toileting via AE/DME   Occupational Therapy Initial Treatment Plan    Treatment Interventions Self Care / Activities of Daily Living;Adaptive Equipment;Neuro Re-Education / Balance;Therapeutic Exercises;Therapeutic Activity   Treatment Frequency 3 Times per Week   Duration Until Therapy Goals Met   Problem List   Problem List Decreased Active  Daily Living Skills;Decreased Functional Mobility;Decreased Activity Tolerance;Safety Awareness Deficits / Cognition;Impaired Postural Control / Balance   Anticipated Discharge Equipment and Recommendations   DC Equipment Recommendations Tub Transfer Bench;Bed Side Commode;Reacher   Discharge Recommendations Recommend post-acute placement for additional occupational therapy services prior to discharge home

## 2023-12-28 NOTE — PROGRESS NOTES
Received report from [facility] LEIDY Boyce. Patient oxygen <90% during shift change. Patient placed on 2LNC to maintain oxygen >90%. RN Assessment completed. Patient having discomfort with brace. MD Cutler notified and adjusted brace at bedside. Right lower extremity elevated. Patient reporting immediate pain relief after non-pharmacological interventions. Patient Alert and oriented x4. Bed is locked and in lowest position. Bed alarm activated. Call light within reach. Per patient, no other concerns at this time.       0800: Patient weaned down to 1LNC for oxygen and maintaining Spo2 >90%.

## 2023-12-28 NOTE — CARE PLAN
The patient is Stable - Low risk of patient condition declining or worsening    Shift Goals  Clinical Goals: Pain management, Stump Protector  Patient Goals: Comfort    Progress made toward(s) clinical / shift goals:    Problem: Pain - Standard  Goal: Alleviation of pain or a reduction in pain to the patient’s comfort goal  Outcome: Progressing     Problem: Knowledge Deficit - Standard  Goal: Patient and family/care givers will demonstrate understanding of plan of care, disease process/condition, diagnostic tests and medications  Outcome: Progressing     Problem: Fluid Volume  Goal: Fluid volume balance will be maintained  Outcome: Progressing       Patient is not progressing towards the following goals:

## 2023-12-28 NOTE — PROGRESS NOTES
Telemetry Shift Summary     Rhythm: SR/ST  Rate: 80s-100s  Measurements: 0.14/0.08/0.32  Ectopy (reported by Monitor Tech): NA     Normal Values  Rhythm: Sinus  HR:   Measurements: 0.12-0.20/0.06-0.10/0.30-0.52

## 2023-12-28 NOTE — CARE PLAN
The patient is Stable - Low risk of patient condition declining or worsening    Shift Goals  Clinical Goals: Wean oxygen to baseline, pain managment  Patient Goals: Adjust brace for more comfort    Progress made toward(s) clinical / shift goals:    Problem: Pain - Standard  Goal: Alleviation of pain or a reduction in pain to the patient’s comfort goal  Outcome: Progressing  Note: Patient reporting decrease pain after pharmacological intervention and repositioning. Brace adjusted by MD to aid with comfort. Educated on medication side effects and safety.      Problem: Knowledge Deficit - Standard  Goal: Patient and family/care givers will demonstrate understanding of plan of care, disease process/condition, diagnostic tests and medications  Outcome: Progressing  Note: Reviewed POC with patient. Explained interventions for elevated blood pressure and pain. Patient receiving IV fluids and IV abx.        Patient is not progressing towards the following goals:      Problem: Hemodynamics  Goal: Patient's hemodynamics, fluid balance and neurologic status will be stable or improve  Outcome: Not Progressing  Note: Blood pressure elevated during shift. Explained pharmacological interventions to manage blood pressure. Patient on 2LNC for most of shift to maintain oxygen >90%. IV fluids during shift. Patient having adequate urine output. No changes in mentation or neurological status during shift.

## 2023-12-29 VITALS
SYSTOLIC BLOOD PRESSURE: 149 MMHG | HEIGHT: 65 IN | OXYGEN SATURATION: 94 % | HEART RATE: 77 BPM | BODY MASS INDEX: 25.2 KG/M2 | WEIGHT: 151.24 LBS | DIASTOLIC BLOOD PRESSURE: 80 MMHG | RESPIRATION RATE: 16 BRPM | TEMPERATURE: 98.6 F

## 2023-12-29 LAB
ANION GAP SERPL CALC-SCNC: 10 MMOL/L (ref 7–16)
BUN SERPL-MCNC: 12 MG/DL (ref 8–22)
CALCIUM SERPL-MCNC: 7.2 MG/DL (ref 8.4–10.2)
CHLORIDE SERPL-SCNC: 99 MMOL/L (ref 96–112)
CO2 SERPL-SCNC: 26 MMOL/L (ref 20–33)
CREAT SERPL-MCNC: 1.53 MG/DL (ref 0.5–1.4)
ERYTHROCYTE [DISTWIDTH] IN BLOOD BY AUTOMATED COUNT: 38.6 FL (ref 35.9–50)
GFR SERPLBLD CREATININE-BSD FMLA CKD-EPI: 55 ML/MIN/1.73 M 2
GLUCOSE BLD STRIP.AUTO-MCNC: 136 MG/DL (ref 65–99)
GLUCOSE BLD STRIP.AUTO-MCNC: 169 MG/DL (ref 65–99)
GLUCOSE SERPL-MCNC: 140 MG/DL (ref 65–99)
HCT VFR BLD AUTO: 28.1 % (ref 42–52)
HGB BLD-MCNC: 9.3 G/DL (ref 14–18)
MCH RBC QN AUTO: 28.9 PG (ref 27–33)
MCHC RBC AUTO-ENTMCNC: 33.1 G/DL (ref 32.3–36.5)
MCV RBC AUTO: 87.3 FL (ref 81.4–97.8)
PLATELET # BLD AUTO: 302 K/UL (ref 164–446)
PMV BLD AUTO: 8.9 FL (ref 9–12.9)
POTASSIUM SERPL-SCNC: 3.5 MMOL/L (ref 3.6–5.5)
RBC # BLD AUTO: 3.22 M/UL (ref 4.7–6.1)
SODIUM SERPL-SCNC: 135 MMOL/L (ref 135–145)
WBC # BLD AUTO: 10.7 K/UL (ref 4.8–10.8)

## 2023-12-29 PROCEDURE — 700111 HCHG RX REV CODE 636 W/ 250 OVERRIDE (IP): Performed by: EMERGENCY MEDICINE

## 2023-12-29 PROCEDURE — 99239 HOSP IP/OBS DSCHRG MGMT >30: CPT | Performed by: INTERNAL MEDICINE

## 2023-12-29 PROCEDURE — 3E0234Z INTRODUCTION OF SERUM, TOXOID AND VACCINE INTO MUSCLE, PERCUTANEOUS APPROACH: ICD-10-PCS | Performed by: EMERGENCY MEDICINE

## 2023-12-29 PROCEDURE — 94760 N-INVAS EAR/PLS OXIMETRY 1: CPT

## 2023-12-29 PROCEDURE — A9270 NON-COVERED ITEM OR SERVICE: HCPCS

## 2023-12-29 PROCEDURE — 700105 HCHG RX REV CODE 258: Performed by: INTERNAL MEDICINE

## 2023-12-29 PROCEDURE — 36415 COLL VENOUS BLD VENIPUNCTURE: CPT

## 2023-12-29 PROCEDURE — 85027 COMPLETE CBC AUTOMATED: CPT

## 2023-12-29 PROCEDURE — A9270 NON-COVERED ITEM OR SERVICE: HCPCS | Performed by: INTERNAL MEDICINE

## 2023-12-29 PROCEDURE — 700105 HCHG RX REV CODE 258: Performed by: HOSPITALIST

## 2023-12-29 PROCEDURE — 90471 IMMUNIZATION ADMIN: CPT

## 2023-12-29 PROCEDURE — 700111 HCHG RX REV CODE 636 W/ 250 OVERRIDE (IP): Mod: JZ | Performed by: INTERNAL MEDICINE

## 2023-12-29 PROCEDURE — 80048 BASIC METABOLIC PNL TOTAL CA: CPT

## 2023-12-29 PROCEDURE — 700102 HCHG RX REV CODE 250 W/ 637 OVERRIDE(OP): Performed by: INTERNAL MEDICINE

## 2023-12-29 PROCEDURE — 90715 TDAP VACCINE 7 YRS/> IM: CPT | Performed by: EMERGENCY MEDICINE

## 2023-12-29 PROCEDURE — 82962 GLUCOSE BLOOD TEST: CPT | Mod: 91

## 2023-12-29 PROCEDURE — 700102 HCHG RX REV CODE 250 W/ 637 OVERRIDE(OP)

## 2023-12-29 RX ORDER — LINEZOLID 600 MG/1
600 TABLET, FILM COATED ORAL 2 TIMES DAILY
Qty: 20 TABLET | Refills: 0 | Status: ACTIVE | DISCHARGE
Start: 2023-12-29 | End: 2024-01-09

## 2023-12-29 RX ORDER — OXYCODONE HYDROCHLORIDE 5 MG/1
5-10 TABLET ORAL EVERY 6 HOURS PRN
Qty: 10 TABLET | Refills: 0 | Status: SHIPPED | OUTPATIENT
Start: 2023-12-29 | End: 2023-12-31

## 2023-12-29 RX ORDER — CHOLECALCIFEROL (VITAMIN D3) 125 MCG
5 CAPSULE ORAL NIGHTLY
Status: DISCONTINUED | OUTPATIENT
Start: 2023-12-29 | End: 2023-12-29 | Stop reason: HOSPADM

## 2023-12-29 RX ORDER — CHOLECALCIFEROL (VITAMIN D3) 125 MCG
CAPSULE ORAL
Status: COMPLETED
Start: 2023-12-29 | End: 2023-12-29

## 2023-12-29 RX ORDER — OMEPRAZOLE 20 MG/1
20 CAPSULE, DELAYED RELEASE ORAL DAILY
Qty: 30 CAPSULE | Status: SHIPPED
Start: 2023-12-30

## 2023-12-29 RX ADMIN — Medication 5 MG: at 01:23

## 2023-12-29 RX ADMIN — SODIUM CHLORIDE, POTASSIUM CHLORIDE, SODIUM LACTATE AND CALCIUM CHLORIDE: 600; 310; 30; 20 INJECTION, SOLUTION INTRAVENOUS at 11:19

## 2023-12-29 RX ADMIN — AMPICILLIN AND SULBACTAM 3 G: 1; 2 INJECTION, POWDER, FOR SOLUTION INTRAMUSCULAR; INTRAVENOUS at 06:13

## 2023-12-29 RX ADMIN — AMPICILLIN AND SULBACTAM 3 G: 1; 2 INJECTION, POWDER, FOR SOLUTION INTRAMUSCULAR; INTRAVENOUS at 11:17

## 2023-12-29 RX ADMIN — INSULIN HUMAN 1 UNITS: 100 INJECTION, SOLUTION PARENTERAL at 06:15

## 2023-12-29 RX ADMIN — CLOSTRIDIUM TETANI TOXOID ANTIGEN (FORMALDEHYDE INACTIVATED), CORYNEBACTERIUM DIPHTHERIAE TOXOID ANTIGEN (FORMALDEHYDE INACTIVATED), BORDETELLA PERTUSSIS TOXOID ANTIGEN (GLUTARALDEHYDE INACTIVATED), BORDETELLA PERTUSSIS FILAMENTOUS HEMAGGLUTININ ANTIGEN (FORMALDEHYDE INACTIVATED), BORDETELLA PERTUSSIS PERTACTIN ANTIGEN, AND BORDETELLA PERTUSSIS FIMBRIAE 2/3 ANTIGEN 0.5 ML: 5; 2; 2.5; 5; 3; 5 INJECTION, SUSPENSION INTRAMUSCULAR at 14:29

## 2023-12-29 RX ADMIN — METOPROLOL TARTRATE 25 MG: 25 TABLET, FILM COATED ORAL at 06:19

## 2023-12-29 RX ADMIN — OMEPRAZOLE 20 MG: 20 CAPSULE, DELAYED RELEASE ORAL at 06:18

## 2023-12-29 ASSESSMENT — PAIN DESCRIPTION - PAIN TYPE: TYPE: ACUTE PAIN

## 2023-12-29 NOTE — PROGRESS NOTES
Discharge instructions given and discussed, signed copy in chart. Pt verbalized understanding and all questions answered. New prescriptions discussed including opioid education. Pt discharged to [facility] via [facility] transport van in stable condition. Personal belongings with patient. IV removed and tolerated well. Tele box removed, monitor tech notified.

## 2023-12-29 NOTE — CARE PLAN
The patient is Stable - Low risk of patient condition declining or worsening    Shift Goals  Clinical Goals: Monitor for S/Sx of infx  Patient Goals: Rest & eat snack    Progress made toward(s) clinical / shift goals:    Problem: Pain - Standard  Goal: Alleviation of pain or a reduction in pain to the patient’s comfort goal  Outcome: Progressing     Problem: Knowledge Deficit - Standard  Goal: Patient and family/care givers will demonstrate understanding of plan of care, disease process/condition, diagnostic tests and medications  Outcome: Progressing     Problem: Fall Risk  Goal: Patient will remain free from falls  Outcome: Progressing       Patient is not progressing towards the following goals:

## 2023-12-29 NOTE — PROGRESS NOTES
Telemetry Shift Summary    Rhythm SR/ST  HR Range   Ectopy n/a  Measurements 0.12/0.08/0.34    Normal Values  Rhythm SR  HR Range:   Measurements: 0.12-0.20/0.06-0.10/0.30-0.52

## 2023-12-29 NOTE — CARE PLAN
The patient is Stable - Low risk of patient condition declining or worsening    Shift Goals  Clinical Goals: IV abx, discharge to [place].  Patient Goals: Sleep    Progress made toward(s) clinical / shift goals:    Problem: Pain - Standard  Goal: Alleviation of pain or a reduction in pain to the patient’s comfort goal  Outcome: Progressing  Note: Denies pain during morning assessment.     Problem: Knowledge Deficit - Standard  Goal: Patient and family/care givers will demonstrate understanding of plan of care, disease process/condition, diagnostic tests and medications  Outcome: Progressing     Problem: Hemodynamics  Goal: Patient's hemodynamics, fluid balance and neurologic status will be stable or improve  Outcome: Progressing       Patient is not progressing towards the following goals:

## 2023-12-29 NOTE — PROGRESS NOTES
Telemetry Shift Summary     Rhythm: SR  HR: 88-94  Ectopy: N/A    Measurements: 0.16/0.08/0.32    Normal Values  Rhythm: SR  HR:   Measurements: 0.12-0.20/0.08-0.10/0.30-0.52

## 2023-12-29 NOTE — DISCHARGE PLANNING
Case Management Discharge Planning    Admission Date: 12/21/2023  GMLOS: 5.2  ALOS: 8    6-Clicks ADL Score: 17  6-Clicks Mobility Score: 15  PT and/or OT Eval ordered: Yes  Post-acute Referrals Ordered: Yes  Post-acute Choice Obtained: Yes  Has referral(s) been sent to post-acute provider:  Yes      Anticipated Discharge Dispo: Discharge Disposition: D/T to SNF with Medicare cert in anticipation of skilled care (03)    DME Needed: No    Action(s) Taken: Pt medically cleared per MD. [facility] has bed available today and will  pt at 1430. Patient notified of transport time and destination. He is agreeable with plan.    [place] prepared, signed by MD and patient. Packet delivered to bedside RN by CM.     Escalations Completed: None    Medically Clear: Yes    Next Steps:  by [facility] at 1430.    Barriers to Discharge: None    Is the patient up for discharge tomorrow: No

## 2023-12-29 NOTE — DISCHARGE SUMMARY
"Discharge Summary    CHIEF COMPLAINT ON ADMISSION  Chief Complaint   Patient presents with    Wound Infection     Pt states he was in shower and felt something bite him. Pt states he then noticed a brown spider wash down the drain. Pt started feeling sick and foot was swollen and \"then all of a sudden the swelling disappeared and foot shriveled up\". Foot appears necrotic infection       Reason for Admission  Bug Bite    Admission Date  12/21/2023     CODE STATUS  Full Code    HPI & HOSPITAL COURSE  Patient is a 48-year-old male with diabetes who presented with black discoloration of the right toe with pain, redness and swelling.  Been doing well until 4 days prior when he thought he felt a spider bite on his right foot and since that time has had worsening pain redness with swelling.  He presented to the emergency room and had acute kidney injury, hyponatremia and elevated lactic acid consistent with sepsis.  Orthopedic surgery was consulted given the extensive nature of the necrosis.  He had large amount of debridement on 12/22 and tolerated the procedure well.  Patient had extensive tissue necrosis and foot could not be safely salvaged thus proceeded with BKA on 12/26 and again tolerated well.  He will continue care with [facility] orth[facility] and should follow up in 2 weeks.  Blood and tissue cultures positive for provotella and did well with Zosyn and transitioned to Zyvox per ID recommendation through 1/9/2024.  Patient worked well with therapies and needs continued wound care and therapy while at SNF.  Patient agreeable with discharge.      Therefore, he is discharged in good and stable condition to skilled nursing facility.    The patient met 2-midnight criteria for an inpatient stay at the time of discharge.      FOLLOW UP ITEMS POST DISCHARGE  PCP and [facility] 2 weeks.    DISCHARGE DIAGNOSES  Principal Problem:    Sepsis (HCC) (POA: Yes)  Active Problems:    Gangrene associated with diabetes " mellitus (HCC) (POA: Yes)    Hyponatremia (POA: Yes)    SRIRAM (acute kidney injury) (HCC) (POA: Yes)    Type 2 diabetes mellitus with diabetic peripheral angiopathy and gangrene, without long-term current use of insulin (HCC) (POA: Yes)    Fracture of the fifth metatarsal (POA: Yes)    Acute hematogenous osteomyelitis of right foot (HCC) (POA: Yes)    Anemia (POA: Yes)    Dehydration (POA: Yes)    Cellulitis of right lower extremity (POA: Yes)    Abnormal EKG (POA: Yes)    Leukocytosis (POA: Unknown)    Hypomagnesemia (POA: Unknown)    Status post below-knee amputation of right lower extremity (HCC) (POA: Unknown)    Bacteremia (POA: Unknown)  Resolved Problems:    * No resolved hospital problems. *      FOLLOW UP  No future appointments.  [facility] ([facility]) - Primary Care and Family Medicine  [address] [place] [id number]  [phone]  Schedule an appointment as soon as possible for a visit in 1 week(s)      [facility]  [address] [place] [id number]  [phone]        Marco Antonio Kang M.D.  [address]  [address]  [facility] [id number] [phone]    Follow up in 2 week(s)        MEDICATIONS ON DISCHARGE     Medication List        START taking these medications        Instructions   linezolid 600 MG Tabs  Commonly known as: Zyvox   Take 1 Tablet by mouth 2 times a day for 11 days.  Dose: 600 mg     metoprolol tartrate 25 MG Tabs  Commonly known as: Lopressor   Take 1 Tablet by mouth 2 times a day.  Dose: 25 mg     omeprazole 20 MG delayed-release capsule  Start taking on: December 30, 2023  Commonly known as: PriLOSEC   Take 1 Capsule by mouth every day.  Dose: 20 mg     oxyCODONE immediate-release 5 MG Tabs  Commonly known as: Roxicodone   Take 1-2 Tablets by mouth every 6 hours as needed for Severe Pain for up to 2 days.  Dose: 5-10 mg            CONTINUE taking these medications        Instructions   acetaminophen 500 MG Tabs  Commonly known as: Tylenol   Take  500-1,000 mg by mouth every 6 hours as needed for Moderate Pain.  Dose: 500-1,000 mg     metformin 1000 MG tablet  Commonly known as: Glucophage   Take 1,000 mg by mouth every morning.  Dose: 1,000 mg            STOP taking these medications      AMOXIL PO     ibuprofen 200 MG Tabs  Commonly known as: Motrin              Allergies  No Known Allergies    DIET  Orders Placed This Encounter   Procedures    Diet Order Diet: Consistent CHO (Diabetic)     Standing Status:   Standing     Number of Occurrences:   1     Order Specific Question:   Diet:     Answer:   Consistent CHO (Diabetic) [4]       ACTIVITY  As tolerated.  Non-weight bearing RIGHT leg    LINES, DRAINS, AND WOUNDS  This is an automated list. Peripheral IVs will be removed prior to discharge.  Peripheral IV 12/21/23 18 G Right Antecubital (Active)   Site Assessment Dry;Intact 12/28/23 2040   Dressing Type Transparent 12/28/23 2040   Line Status Scrubbed the hub prior to access;Flushed;Saline locked 12/28/23 2040   Dressing Status Clean;Dry;Intact 12/28/23 2040   Dressing Intervention N/A 12/28/23 2040   Date Primary Tubing Changed 12/28/23 12/28/23 1309   Date Secondary Tubing Changed 12/28/23 12/28/23 1309   NEXT Primary Tubing Change  12/26/23 12/24/23 2115   NEXT Secondary Tubing Change  12/25/23 12/24/23 2115   [place] Grading [place] 12/28/23 2040   Phlebitis Scale ([facility] Only) 0 12/28/23 2040       Peripheral IV 12/22/23 20 G Anterior;Left Forearm (Active)   Site Assessment Clean;Dry;Intact 12/28/23 2040   Dressing Type Transparent 12/28/23 2040   Line Status Scrubbed the hub prior to access;Flushed;Saline locked 12/28/23 2040   Dressing Status Clean;Dry;Intact 12/28/23 2040   Dressing Intervention N/A 12/28/23 2040   Dressing Change Due 12/30/23 12/25/23 1930   Date Primary Tubing Changed 12/28/23 12/28/23 1309   Date Secondary Tubing Changed 12/28/23 12/28/23 1309   NEXT Primary Tubing Change  12/26/23 12/24/23 2115   Infiltration  Grading ([facility] [place]) 0 12/28/23 2040   Phlebitis Scale ([facility] Only) 0 12/28/23 2040       Wound 12/21/23 Incision Foot Right WOUND VAC, MASTISOL, IOBAN, 4X4,  ACE WRAP (Active)   Wound Image      12/23/23 1700   Site Assessment MARTINEZ 12/28/23 2040   Periwound Assessment MARTINEZ 12/28/23 2040   Margins MARTINEZ 12/27/23 0800   Closure MARTINEZ 12/27/23 0800   Drainage Amount Small 12/23/23 1700   Drainage Description MARTINEZ 12/27/23 0800   Treatments Offloading 12/27/23 0800   Offloading/DME Other (comment) 12/27/23 0800   Dressing Status Clean;Dry;Intact 12/26/23 0930   Dressing Changed Observed 12/27/23 0800   Dressing Options Wound Vac;Adaptic;Hydrofiber Silver 12/25/23 0110   Dressing Change/Treatment Frequency Tuesday, Thursday, Saturday, and As Needed 12/25/23 0110   NEXT Dressing Change/Treatment Date 12/26/23 12/25/23 0110   NEXT Weekly Photo (Inpatient Only) 12/30/23 12/23/23 1700   Wound Team Following Other (comment) 12/23/23 1700   Wound Length (cm) 11 cm 12/23/23 1700   Wound Width (cm) 8.5 cm 12/23/23 1700   Wound Surface Area (cm^2) 93.5 cm^2 12/23/23 1700   Wound Bed Granulation (%) 60 % 12/23/23 1700   Wound Bed Slough (%) 10 % 12/23/23 1700   Wound Odor Foul 12/23/23 1700   Pulses DP;PT;2+;Right;Doppler 12/23/23 1700   WOUND NURSE ONLY - Time Spent with Patient (mins) 60 12/23/23 1700       Peripheral IV 12/21/23 18 G Right Antecubital (Active)   Site Assessment Dry;Intact 12/28/23 2040   Dressing Type Transparent 12/28/23 2040   Line Status Scrubbed the hub prior to access;Flushed;Saline locked 12/28/23 2040   Dressing Status Clean;Dry;Intact 12/28/23 2040   Dressing Intervention N/A 12/28/23 2040   Date Primary Tubing Changed 12/28/23 12/28/23 1309   Date Secondary Tubing Changed 12/28/23 12/28/23 1309   NEXT Primary Tubing Change  12/26/23 12/24/23 2115   NEXT Secondary Tubing Change  12/25/23 12/24/23 2115   Infiltration Grading ([facility] [place]) 0 12/28/23 2040   Phlebitis Scale ([facility] Only) 0 12/28/23 2040        Peripheral IV 12/22/23 20 G Anterior;Left Forearm (Active)   Site Assessment Clean;Dry;Intact 12/28/23 2040   Dressing Type Transparent 12/28/23 2040   Line Status Scrubbed the hub prior to access;Flushed;Saline locked 12/28/23 2040   Dressing Status Clean;Dry;Intact 12/28/23 2040   Dressing Intervention N/A 12/28/23 2040   Dressing Change Due 12/30/23 12/25/23 1930   Date Primary Tubing Changed 12/28/23 12/28/23 1309   Date Secondary Tubing Changed 12/28/23 12/28/23 1309   NEXT Primary Tubing Change  12/26/23 12/24/23 2115   Infiltration Grading ([facility] [place]) 0 12/28/23 2040   Phlebitis Scale ([facility] Only) 0 12/28/23 2040               MENTAL STATUS ON TRANSFER             CONSULTATIONS  Ortho - Kang    PROCEDURES  12/21 - Kang - Right foot irrigation and excisional debridement down to bone, wound size 8 cm x 8 cm  Right foot toe amputation of the fourth and fifth toes at the metatarsal phalangeal joint  Right foot wound VAC placement, 8 x 8 cm    12/26 - Kang - Right below-knee amputation     LABORATORY  Lab Results   Component Value Date    SODIUM 135 12/29/2023    POTASSIUM 3.5 (L) 12/29/2023    CHLORIDE 99 12/29/2023    CO2 26 12/29/2023    GLUCOSE 140 (H) 12/29/2023    BUN 12 12/29/2023    CREATININE 1.53 (H) 12/29/2023        Lab Results   Component Value Date    WBC 10.7 12/29/2023    HEMOGLOBIN 9.3 (L) 12/29/2023    HEMATOCRIT 28.1 (L) 12/29/2023    PLATELETCT 302 12/29/2023        Total time of the discharge process exceeds 36 minutes.

## 2024-01-01 LAB
BACTERIA BLD CULT: NORMAL
BACTERIA BLD CULT: NORMAL
SIGNIFICANT IND 70042: NORMAL
SIGNIFICANT IND 70042: NORMAL
SITE SITE: NORMAL
SITE SITE: NORMAL
SOURCE SOURCE: NORMAL
SOURCE SOURCE: NORMAL

## 2024-01-04 NOTE — DISCHARGE PLANNING
Patient calls today asking for a work clearance note.  Patient not see at [facility]. Patient is not sure how his call was sent here.    Patient instructed he will want to FU with Dr. Kang post-op for that clearance. He states the surgeon has left the state. I explain that he is part of a large ortho group here in [place] and someone will be assigned to see his post op patients.    Follow up with Physician Marco Antonio Kang M.D. in 2 week(s)   [facility]  [phone]    Patient states understanding now. Patient also informed that this can be seen in his My Chart.

## 2024-01-16 LAB
FUNGUS SPEC CULT: NORMAL
FUNGUS SPEC CULT: NORMAL
FUNGUS SPEC FUNGUS STN: NORMAL
FUNGUS SPEC FUNGUS STN: NORMAL
SIGNIFICANT IND 70042: NORMAL
SIGNIFICANT IND 70042: NORMAL
SITE SITE: NORMAL
SITE SITE: NORMAL
SOURCE SOURCE: NORMAL
SOURCE SOURCE: NORMAL
